# Patient Record
Sex: FEMALE | Race: BLACK OR AFRICAN AMERICAN | NOT HISPANIC OR LATINO
[De-identification: names, ages, dates, MRNs, and addresses within clinical notes are randomized per-mention and may not be internally consistent; named-entity substitution may affect disease eponyms.]

---

## 2017-01-23 ENCOUNTER — APPOINTMENT (OUTPATIENT)
Dept: RHEUMATOLOGY | Facility: HOSPITAL | Age: 62
End: 2017-01-23

## 2017-01-30 ENCOUNTER — APPOINTMENT (OUTPATIENT)
Dept: RHEUMATOLOGY | Facility: HOSPITAL | Age: 62
End: 2017-01-30

## 2017-02-26 ENCOUNTER — FORM ENCOUNTER (OUTPATIENT)
Age: 62
End: 2017-02-26

## 2017-02-27 ENCOUNTER — LABORATORY RESULT (OUTPATIENT)
Age: 62
End: 2017-02-27

## 2017-02-27 ENCOUNTER — APPOINTMENT (OUTPATIENT)
Dept: RADIOLOGY | Facility: HOSPITAL | Age: 62
End: 2017-02-27

## 2017-02-27 ENCOUNTER — APPOINTMENT (OUTPATIENT)
Dept: RHEUMATOLOGY | Facility: HOSPITAL | Age: 62
End: 2017-02-27

## 2017-02-27 ENCOUNTER — OUTPATIENT (OUTPATIENT)
Dept: OUTPATIENT SERVICES | Facility: HOSPITAL | Age: 62
LOS: 1 days | End: 2017-02-27
Payer: MEDICAID

## 2017-02-27 VITALS
DIASTOLIC BLOOD PRESSURE: 100 MMHG | SYSTOLIC BLOOD PRESSURE: 195 MMHG | BODY MASS INDEX: 32.33 KG/M2 | WEIGHT: 206 LBS | HEIGHT: 67 IN

## 2017-02-27 DIAGNOSIS — M06.9 RHEUMATOID ARTHRITIS, UNSPECIFIED: ICD-10-CM

## 2017-02-27 DIAGNOSIS — M19.90 UNSPECIFIED OSTEOARTHRITIS, UNSPECIFIED SITE: ICD-10-CM

## 2017-02-27 LAB
ALBUMIN SERPL ELPH-MCNC: 4.1 G/DL — SIGNIFICANT CHANGE UP (ref 3.3–5)
ALP SERPL-CCNC: 115 U/L — SIGNIFICANT CHANGE UP (ref 40–120)
ALT FLD-CCNC: 14 U/L — SIGNIFICANT CHANGE UP (ref 4–33)
AST SERPL-CCNC: 15 U/L — SIGNIFICANT CHANGE UP (ref 4–32)
BASOPHILS # BLD AUTO: 0.05 K/UL — SIGNIFICANT CHANGE UP (ref 0–0.2)
BASOPHILS NFR BLD AUTO: 0.8 % — SIGNIFICANT CHANGE UP (ref 0–2)
BILIRUB SERPL-MCNC: 0.4 MG/DL — SIGNIFICANT CHANGE UP (ref 0.2–1.2)
BUN SERPL-MCNC: 12 MG/DL — SIGNIFICANT CHANGE UP (ref 7–23)
CALCIUM SERPL-MCNC: 10.5 MG/DL — SIGNIFICANT CHANGE UP (ref 8.4–10.5)
CHLORIDE SERPL-SCNC: 101 MMOL/L — SIGNIFICANT CHANGE UP (ref 98–107)
CO2 SERPL-SCNC: 28 MMOL/L — SIGNIFICANT CHANGE UP (ref 22–31)
CREAT SERPL-MCNC: 0.76 MG/DL — SIGNIFICANT CHANGE UP (ref 0.5–1.3)
CRP SERPL-MCNC: 14.6 MG/L — HIGH (ref 0.3–5)
EOSINOPHIL # BLD AUTO: 0.12 K/UL — SIGNIFICANT CHANGE UP (ref 0–0.5)
EOSINOPHIL NFR BLD AUTO: 1.8 % — SIGNIFICANT CHANGE UP (ref 0–6)
ERYTHROCYTE [SEDIMENTATION RATE] IN BLOOD: 51 MM/HR — HIGH (ref 4–25)
GLUCOSE SERPL-MCNC: 147 MG/DL — HIGH (ref 70–99)
HCT VFR BLD CALC: 39.4 % — SIGNIFICANT CHANGE UP (ref 34.5–45)
HGB BLD-MCNC: 12.6 G/DL — SIGNIFICANT CHANGE UP (ref 11.5–15.5)
IMM GRANULOCYTES NFR BLD AUTO: 0.2 % — SIGNIFICANT CHANGE UP (ref 0–1.5)
LYMPHOCYTES # BLD AUTO: 2.64 K/UL — SIGNIFICANT CHANGE UP (ref 1–3.3)
LYMPHOCYTES # BLD AUTO: 40.1 % — SIGNIFICANT CHANGE UP (ref 13–44)
MANUAL SMEAR VERIFICATION: SIGNIFICANT CHANGE UP
MCHC RBC-ENTMCNC: 28.6 PG — SIGNIFICANT CHANGE UP (ref 27–34)
MCHC RBC-ENTMCNC: 32 % — SIGNIFICANT CHANGE UP (ref 32–36)
MCV RBC AUTO: 89.3 FL — SIGNIFICANT CHANGE UP (ref 80–100)
MONOCYTES # BLD AUTO: 0.52 K/UL — SIGNIFICANT CHANGE UP (ref 0–0.9)
MONOCYTES NFR BLD AUTO: 7.9 % — SIGNIFICANT CHANGE UP (ref 2–14)
MORPHOLOGY BLD-IMP: SIGNIFICANT CHANGE UP
NEUTROPHILS # BLD AUTO: 3.25 K/UL — SIGNIFICANT CHANGE UP (ref 1.8–7.4)
NEUTROPHILS NFR BLD AUTO: 49.2 % — SIGNIFICANT CHANGE UP (ref 43–77)
PLATELET # BLD AUTO: 289 K/UL — SIGNIFICANT CHANGE UP (ref 150–400)
PLATELET COUNT - ESTIMATE: NORMAL — SIGNIFICANT CHANGE UP
PMV BLD: 12.3 FL — SIGNIFICANT CHANGE UP (ref 7–13)
POTASSIUM SERPL-MCNC: 4 MMOL/L — SIGNIFICANT CHANGE UP (ref 3.5–5.3)
POTASSIUM SERPL-SCNC: 4 MMOL/L — SIGNIFICANT CHANGE UP (ref 3.5–5.3)
PROT SERPL-MCNC: 8.3 G/DL — SIGNIFICANT CHANGE UP (ref 6–8.3)
RBC # BLD: 4.41 M/UL — SIGNIFICANT CHANGE UP (ref 3.8–5.2)
RBC # FLD: 13.2 % — SIGNIFICANT CHANGE UP (ref 10.3–14.5)
SODIUM SERPL-SCNC: 144 MMOL/L — SIGNIFICANT CHANGE UP (ref 135–145)
WBC # BLD: 6.59 K/UL — SIGNIFICANT CHANGE UP (ref 3.8–10.5)
WBC # FLD AUTO: 6.59 K/UL — SIGNIFICANT CHANGE UP (ref 3.8–10.5)

## 2017-02-27 PROCEDURE — 73564 X-RAY EXAM KNEE 4 OR MORE: CPT | Mod: 26,50

## 2017-03-20 ENCOUNTER — OUTPATIENT (OUTPATIENT)
Dept: OUTPATIENT SERVICES | Facility: HOSPITAL | Age: 62
LOS: 1 days | End: 2017-03-20

## 2017-03-20 ENCOUNTER — LABORATORY RESULT (OUTPATIENT)
Age: 62
End: 2017-03-20

## 2017-03-20 ENCOUNTER — APPOINTMENT (OUTPATIENT)
Dept: RHEUMATOLOGY | Facility: HOSPITAL | Age: 62
End: 2017-03-20

## 2017-03-20 VITALS
WEIGHT: 207 LBS | DIASTOLIC BLOOD PRESSURE: 77 MMHG | HEIGHT: 67 IN | SYSTOLIC BLOOD PRESSURE: 176 MMHG | BODY MASS INDEX: 32.49 KG/M2 | HEART RATE: 80 BPM

## 2017-03-20 DIAGNOSIS — M25.569 PAIN IN UNSPECIFIED KNEE: ICD-10-CM

## 2017-03-20 DIAGNOSIS — M06.9 RHEUMATOID ARTHRITIS, UNSPECIFIED: ICD-10-CM

## 2017-03-20 LAB
ALBUMIN SERPL ELPH-MCNC: 4.1 G/DL — SIGNIFICANT CHANGE UP (ref 3.3–5)
ALP SERPL-CCNC: 108 U/L — SIGNIFICANT CHANGE UP (ref 40–120)
ALT FLD-CCNC: 11 U/L — SIGNIFICANT CHANGE UP (ref 4–33)
AST SERPL-CCNC: 14 U/L — SIGNIFICANT CHANGE UP (ref 4–32)
BASOPHILS # BLD AUTO: 0.08 K/UL — SIGNIFICANT CHANGE UP (ref 0–0.2)
BASOPHILS NFR BLD AUTO: 1.1 % — SIGNIFICANT CHANGE UP (ref 0–2)
BILIRUB SERPL-MCNC: 0.3 MG/DL — SIGNIFICANT CHANGE UP (ref 0.2–1.2)
BUN SERPL-MCNC: 10 MG/DL — SIGNIFICANT CHANGE UP (ref 7–23)
CALCIUM SERPL-MCNC: 10.1 MG/DL — SIGNIFICANT CHANGE UP (ref 8.4–10.5)
CHLORIDE SERPL-SCNC: 103 MMOL/L — SIGNIFICANT CHANGE UP (ref 98–107)
CO2 SERPL-SCNC: 23 MMOL/L — SIGNIFICANT CHANGE UP (ref 22–31)
CREAT SERPL-MCNC: 0.67 MG/DL — SIGNIFICANT CHANGE UP (ref 0.5–1.3)
CRP SERPL-MCNC: 8 MG/L — HIGH (ref 0.3–5)
EOSINOPHIL # BLD AUTO: 0.14 K/UL — SIGNIFICANT CHANGE UP (ref 0–0.5)
EOSINOPHIL NFR BLD AUTO: 1.9 % — SIGNIFICANT CHANGE UP (ref 0–6)
ERYTHROCYTE [SEDIMENTATION RATE] IN BLOOD: 74 MM/HR — HIGH (ref 4–25)
GLUCOSE SERPL-MCNC: 176 MG/DL — HIGH (ref 70–99)
HCT VFR BLD CALC: 36.8 % — SIGNIFICANT CHANGE UP (ref 34.5–45)
HGB BLD-MCNC: 11.6 G/DL — SIGNIFICANT CHANGE UP (ref 11.5–15.5)
IMM GRANULOCYTES NFR BLD AUTO: 0.1 % — SIGNIFICANT CHANGE UP (ref 0–1.5)
LYMPHOCYTES # BLD AUTO: 3.14 K/UL — SIGNIFICANT CHANGE UP (ref 1–3.3)
LYMPHOCYTES # BLD AUTO: 42.5 % — SIGNIFICANT CHANGE UP (ref 13–44)
MCHC RBC-ENTMCNC: 28.3 PG — SIGNIFICANT CHANGE UP (ref 27–34)
MCHC RBC-ENTMCNC: 31.5 % — LOW (ref 32–36)
MCV RBC AUTO: 89.8 FL — SIGNIFICANT CHANGE UP (ref 80–100)
MONOCYTES # BLD AUTO: 0.62 K/UL — SIGNIFICANT CHANGE UP (ref 0–0.9)
MONOCYTES NFR BLD AUTO: 8.4 % — SIGNIFICANT CHANGE UP (ref 2–14)
NEUTROPHILS # BLD AUTO: 3.39 K/UL — SIGNIFICANT CHANGE UP (ref 1.8–7.4)
NEUTROPHILS NFR BLD AUTO: 46 % — SIGNIFICANT CHANGE UP (ref 43–77)
PLATELET # BLD AUTO: 378 K/UL — SIGNIFICANT CHANGE UP (ref 150–400)
PMV BLD: 11.7 FL — SIGNIFICANT CHANGE UP (ref 7–13)
POTASSIUM SERPL-MCNC: 3.6 MMOL/L — SIGNIFICANT CHANGE UP (ref 3.5–5.3)
POTASSIUM SERPL-SCNC: 3.6 MMOL/L — SIGNIFICANT CHANGE UP (ref 3.5–5.3)
PROT SERPL-MCNC: 8.5 G/DL — HIGH (ref 6–8.3)
RBC # BLD: 4.1 M/UL — SIGNIFICANT CHANGE UP (ref 3.8–5.2)
RBC # FLD: 13.2 % — SIGNIFICANT CHANGE UP (ref 10.3–14.5)
SODIUM SERPL-SCNC: 144 MMOL/L — SIGNIFICANT CHANGE UP (ref 135–145)
WBC # BLD: 7.38 K/UL — SIGNIFICANT CHANGE UP (ref 3.8–10.5)
WBC # FLD AUTO: 7.38 K/UL — SIGNIFICANT CHANGE UP (ref 3.8–10.5)

## 2017-05-22 ENCOUNTER — APPOINTMENT (OUTPATIENT)
Dept: RHEUMATOLOGY | Facility: HOSPITAL | Age: 62
End: 2017-05-22

## 2017-06-09 ENCOUNTER — INPATIENT (INPATIENT)
Facility: HOSPITAL | Age: 62
LOS: 0 days | Discharge: HOME | End: 2017-06-10
Attending: INTERNAL MEDICINE | Admitting: INTERNAL MEDICINE
Payer: MEDICAID

## 2017-06-09 VITALS
SYSTOLIC BLOOD PRESSURE: 185 MMHG | RESPIRATION RATE: 16 BRPM | TEMPERATURE: 98 F | DIASTOLIC BLOOD PRESSURE: 78 MMHG | HEART RATE: 76 BPM | OXYGEN SATURATION: 97 %

## 2017-06-09 DIAGNOSIS — I24.9 ACUTE ISCHEMIC HEART DISEASE, UNSPECIFIED: ICD-10-CM

## 2017-06-09 DIAGNOSIS — Z98.890 OTHER SPECIFIED POSTPROCEDURAL STATES: Chronic | ICD-10-CM

## 2017-06-09 LAB
ALBUMIN SERPL ELPH-MCNC: 4.3 G/DL — SIGNIFICANT CHANGE UP (ref 3.3–5)
ALP SERPL-CCNC: 117 U/L — SIGNIFICANT CHANGE UP (ref 40–120)
ALT FLD-CCNC: 13 U/L — SIGNIFICANT CHANGE UP (ref 4–33)
APPEARANCE UR: CLEAR — SIGNIFICANT CHANGE UP
APTT BLD: 33.6 SEC — SIGNIFICANT CHANGE UP (ref 27.5–37.4)
AST SERPL-CCNC: 15 U/L — SIGNIFICANT CHANGE UP (ref 4–32)
BACTERIA # UR AUTO: SIGNIFICANT CHANGE UP
BASOPHILS # BLD AUTO: 0.04 K/UL — SIGNIFICANT CHANGE UP (ref 0–0.2)
BASOPHILS NFR BLD AUTO: 0.5 % — SIGNIFICANT CHANGE UP (ref 0–2)
BILIRUB SERPL-MCNC: 0.2 MG/DL — SIGNIFICANT CHANGE UP (ref 0.2–1.2)
BILIRUB UR-MCNC: NEGATIVE — SIGNIFICANT CHANGE UP
BLOOD UR QL VISUAL: NEGATIVE — SIGNIFICANT CHANGE UP
BUN SERPL-MCNC: 11 MG/DL — SIGNIFICANT CHANGE UP (ref 7–23)
CALCIUM SERPL-MCNC: 9.7 MG/DL — SIGNIFICANT CHANGE UP (ref 8.4–10.5)
CHLORIDE SERPL-SCNC: 103 MMOL/L — SIGNIFICANT CHANGE UP (ref 98–107)
CK MB BLD-MCNC: 1.68 NG/ML — SIGNIFICANT CHANGE UP (ref 1–4.7)
CK MB BLD-MCNC: SIGNIFICANT CHANGE UP (ref 0–2.5)
CK SERPL-CCNC: 113 U/L — SIGNIFICANT CHANGE UP (ref 25–170)
CO2 SERPL-SCNC: 27 MMOL/L — SIGNIFICANT CHANGE UP (ref 22–31)
COLOR SPEC: SIGNIFICANT CHANGE UP
CREAT SERPL-MCNC: 0.82 MG/DL — SIGNIFICANT CHANGE UP (ref 0.5–1.3)
D DIMER BLD IA.RAPID-MCNC: 169 NG/ML — SIGNIFICANT CHANGE UP
EOSINOPHIL # BLD AUTO: 0.15 K/UL — SIGNIFICANT CHANGE UP (ref 0–0.5)
EOSINOPHIL NFR BLD AUTO: 1.7 % — SIGNIFICANT CHANGE UP (ref 0–6)
GLUCOSE SERPL-MCNC: 110 MG/DL — HIGH (ref 70–99)
GLUCOSE UR-MCNC: NEGATIVE — SIGNIFICANT CHANGE UP
HCT VFR BLD CALC: 40.2 % — SIGNIFICANT CHANGE UP (ref 34.5–45)
HGB BLD-MCNC: 12.6 G/DL — SIGNIFICANT CHANGE UP (ref 11.5–15.5)
IMM GRANULOCYTES NFR BLD AUTO: 0.1 % — SIGNIFICANT CHANGE UP (ref 0–1.5)
INR BLD: 1.03 — SIGNIFICANT CHANGE UP (ref 0.88–1.17)
KETONES UR-MCNC: NEGATIVE — SIGNIFICANT CHANGE UP
LEUKOCYTE ESTERASE UR-ACNC: NEGATIVE — SIGNIFICANT CHANGE UP
LYMPHOCYTES # BLD AUTO: 4.84 K/UL — HIGH (ref 1–3.3)
LYMPHOCYTES # BLD AUTO: 55.1 % — HIGH (ref 13–44)
MCHC RBC-ENTMCNC: 27.9 PG — SIGNIFICANT CHANGE UP (ref 27–34)
MCHC RBC-ENTMCNC: 31.3 % — LOW (ref 32–36)
MCV RBC AUTO: 89.1 FL — SIGNIFICANT CHANGE UP (ref 80–100)
MONOCYTES # BLD AUTO: 0.81 K/UL — SIGNIFICANT CHANGE UP (ref 0–0.9)
MONOCYTES NFR BLD AUTO: 9.2 % — SIGNIFICANT CHANGE UP (ref 2–14)
MUCOUS THREADS # UR AUTO: SIGNIFICANT CHANGE UP
NEUTROPHILS # BLD AUTO: 2.94 K/UL — SIGNIFICANT CHANGE UP (ref 1.8–7.4)
NEUTROPHILS NFR BLD AUTO: 33.4 % — LOW (ref 43–77)
NITRITE UR-MCNC: NEGATIVE — SIGNIFICANT CHANGE UP
PH UR: 7 — SIGNIFICANT CHANGE UP (ref 4.6–8)
PLATELET # BLD AUTO: 325 K/UL — SIGNIFICANT CHANGE UP (ref 150–400)
PMV BLD: 11.2 FL — SIGNIFICANT CHANGE UP (ref 7–13)
POTASSIUM SERPL-MCNC: 3.9 MMOL/L — SIGNIFICANT CHANGE UP (ref 3.5–5.3)
POTASSIUM SERPL-SCNC: 3.9 MMOL/L — SIGNIFICANT CHANGE UP (ref 3.5–5.3)
PROT SERPL-MCNC: 8.6 G/DL — HIGH (ref 6–8.3)
PROT UR-MCNC: NEGATIVE — SIGNIFICANT CHANGE UP
PROTHROM AB SERPL-ACNC: 11.5 SEC — SIGNIFICANT CHANGE UP (ref 9.8–13.1)
RBC # BLD: 4.51 M/UL — SIGNIFICANT CHANGE UP (ref 3.8–5.2)
RBC # FLD: 13.3 % — SIGNIFICANT CHANGE UP (ref 10.3–14.5)
RBC CASTS # UR COMP ASSIST: SIGNIFICANT CHANGE UP (ref 0–?)
SODIUM SERPL-SCNC: 143 MMOL/L — SIGNIFICANT CHANGE UP (ref 135–145)
SP GR SPEC: 1.01 — SIGNIFICANT CHANGE UP (ref 1–1.03)
SQUAMOUS # UR AUTO: SIGNIFICANT CHANGE UP
TROPONIN T SERPL-MCNC: < 0.06 NG/ML — SIGNIFICANT CHANGE UP (ref 0–0.06)
UROBILINOGEN FLD QL: NORMAL E.U. — SIGNIFICANT CHANGE UP (ref 0.1–0.2)
WBC # BLD: 8.79 K/UL — SIGNIFICANT CHANGE UP (ref 3.8–10.5)
WBC # FLD AUTO: 8.79 K/UL — SIGNIFICANT CHANGE UP (ref 3.8–10.5)
WBC UR QL: SIGNIFICANT CHANGE UP (ref 0–?)

## 2017-06-09 PROCEDURE — 71020: CPT | Mod: 26

## 2017-06-09 RX ORDER — ACETAMINOPHEN 500 MG
650 TABLET ORAL EVERY 6 HOURS
Qty: 0 | Refills: 0 | Status: DISCONTINUED | OUTPATIENT
Start: 2017-06-09 | End: 2017-06-10

## 2017-06-09 RX ORDER — ASPIRIN/CALCIUM CARB/MAGNESIUM 324 MG
162 TABLET ORAL ONCE
Qty: 0 | Refills: 0 | Status: COMPLETED | OUTPATIENT
Start: 2017-06-09 | End: 2017-06-09

## 2017-06-09 RX ORDER — LIDOCAINE 4 G/100G
1 CREAM TOPICAL DAILY
Qty: 0 | Refills: 0 | Status: DISCONTINUED | OUTPATIENT
Start: 2017-06-09 | End: 2017-06-10

## 2017-06-09 RX ORDER — CYCLOBENZAPRINE HYDROCHLORIDE 10 MG/1
5 TABLET, FILM COATED ORAL THREE TIMES A DAY
Qty: 0 | Refills: 0 | Status: DISCONTINUED | OUTPATIENT
Start: 2017-06-09 | End: 2017-06-10

## 2017-06-09 RX ORDER — OXYCODONE HYDROCHLORIDE 5 MG/1
5 TABLET ORAL ONCE
Qty: 0 | Refills: 0 | Status: DISCONTINUED | OUTPATIENT
Start: 2017-06-09 | End: 2017-06-09

## 2017-06-09 RX ADMIN — Medication 162 MILLIGRAM(S): at 22:03

## 2017-06-09 NOTE — ED ADULT TRIAGE NOTE - CHIEF COMPLAINT QUOTE
Pt arrives to ED c/o back pain that radiates to her chest.  Pain has been going on for 3 days.  Pt has hx of a heart murmur.  Pt hypertensive in triage.  Pt reports she takes HTN meds.  EKG in triage.  Pt is mother of [employer] employee. Pt arrives to ED c/o back pain that radiates to her chest.  Pain has been going on for 3 days.  Pt has hx of a heart murmur.  Pt hypertensive in triage.  Pt reports she takes HTN meds.  EKG in triage.  Pt is mother of [employer] employee.  Pt upgraded due to ekg results

## 2017-06-09 NOTE — ED PROVIDER NOTE - OBJECTIVE STATEMENT
61F p/w R lower chest pain wrapping around x 3 days, initially intermittent but now worsening.  A/w CORREA and worsening on exertion (stairs), and better at rest.  No trauma, no fever, no dysuria, no vomiting, no change with food, no rash, never happened before.  Pt has RA on Humira, no recent ST or cath.

## 2017-06-09 NOTE — ED ADULT NURSE NOTE - OBJECTIVE STATEMENT
pt received to room 5 pt is A&0x3 pt comes to ED for back pain that travels to her chest x 3 days. pt has hx of HTN. pt BP is elevated otherwise VSS pt is NSR on monitor. 20 g placed in R AC labs were drawn and sent EDMD at bedside for eval awaiting further orders Will continue to monitor the pt

## 2017-06-09 NOTE — ED PROVIDER NOTE - MEDICAL DECISION MAKING DETAILS
61F p/w R sided chest pain, exertional, a/w dizziness and some SOB.  EKG abnormal, subtle ST depressions II, III, AVF, V5, no TWI.  RX ASA.  Given RA (RF for ACS) will check labs, CXR, CE.   Given RA and laterality of pain and dyspnea will check dimer.  Admit

## 2017-06-09 NOTE — ED SUB INTERN NOTE - OBJECTIVE STATEMENT FT
61yF with RA on Humira, HTN, recent EKG change and new murmur found by PMD p/w R chest pain radiating to back for 3d. Pain was sudden in onset and waxed and waned until yday PM, when pain became constant 7/10.

## 2017-06-10 VITALS
SYSTOLIC BLOOD PRESSURE: 139 MMHG | RESPIRATION RATE: 16 BRPM | DIASTOLIC BLOOD PRESSURE: 61 MMHG | TEMPERATURE: 98 F | HEART RATE: 60 BPM | OXYGEN SATURATION: 100 %

## 2017-06-10 DIAGNOSIS — Z29.9 ENCOUNTER FOR PROPHYLACTIC MEASURES, UNSPECIFIED: ICD-10-CM

## 2017-06-10 DIAGNOSIS — I10 ESSENTIAL (PRIMARY) HYPERTENSION: ICD-10-CM

## 2017-06-10 DIAGNOSIS — T14.8 OTHER INJURY OF UNSPECIFIED BODY REGION: ICD-10-CM

## 2017-06-10 DIAGNOSIS — M06.9 RHEUMATOID ARTHRITIS, UNSPECIFIED: ICD-10-CM

## 2017-06-10 DIAGNOSIS — I24.9 ACUTE ISCHEMIC HEART DISEASE, UNSPECIFIED: ICD-10-CM

## 2017-06-10 LAB
CHOLEST SERPL-MCNC: 234 MG/DL — HIGH (ref 120–199)
CK SERPL-CCNC: 103 U/L — SIGNIFICANT CHANGE UP (ref 25–170)
HBA1C BLD-MCNC: 7.4 % — HIGH (ref 4–5.6)
HDLC SERPL-MCNC: 107 MG/DL — HIGH (ref 45–65)
LIPID PNL WITH DIRECT LDL SERPL: 121 MG/DL — SIGNIFICANT CHANGE UP
TRIGL SERPL-MCNC: 131 MG/DL — SIGNIFICANT CHANGE UP (ref 10–149)
TROPONIN T SERPL-MCNC: < 0.06 NG/ML — SIGNIFICANT CHANGE UP (ref 0–0.06)

## 2017-06-10 PROCEDURE — 71250 CT THORAX DX C-: CPT | Mod: 26

## 2017-06-10 RX ORDER — ATENOLOL 25 MG/1
1 TABLET ORAL
Qty: 0 | Refills: 0 | COMMUNITY

## 2017-06-10 RX ORDER — SODIUM CHLORIDE 9 MG/ML
3 INJECTION INTRAMUSCULAR; INTRAVENOUS; SUBCUTANEOUS EVERY 8 HOURS
Qty: 0 | Refills: 0 | Status: DISCONTINUED | OUTPATIENT
Start: 2017-06-10 | End: 2017-06-10

## 2017-06-10 RX ORDER — ASPIRIN/CALCIUM CARB/MAGNESIUM 324 MG
81 TABLET ORAL DAILY
Qty: 0 | Refills: 0 | Status: DISCONTINUED | OUTPATIENT
Start: 2017-06-10 | End: 2017-06-10

## 2017-06-10 RX ORDER — AMLODIPINE BESYLATE 2.5 MG/1
5 TABLET ORAL DAILY
Qty: 0 | Refills: 0 | Status: DISCONTINUED | OUTPATIENT
Start: 2017-06-10 | End: 2017-06-10

## 2017-06-10 RX ORDER — OXYCODONE HYDROCHLORIDE 5 MG/1
1 TABLET ORAL
Qty: 12 | Refills: 0 | OUTPATIENT
Start: 2017-06-10 | End: 2017-06-13

## 2017-06-10 RX ORDER — AMLODIPINE BESYLATE 2.5 MG/1
1 TABLET ORAL
Qty: 0 | Refills: 0 | COMMUNITY

## 2017-06-10 RX ORDER — ASPIRIN/CALCIUM CARB/MAGNESIUM 324 MG
1 TABLET ORAL
Qty: 0 | Refills: 0 | COMMUNITY

## 2017-06-10 RX ORDER — ENOXAPARIN SODIUM 100 MG/ML
40 INJECTION SUBCUTANEOUS EVERY 24 HOURS
Qty: 0 | Refills: 0 | Status: DISCONTINUED | OUTPATIENT
Start: 2017-06-10 | End: 2017-06-10

## 2017-06-10 RX ORDER — CYCLOBENZAPRINE HYDROCHLORIDE 10 MG/1
1 TABLET, FILM COATED ORAL
Qty: 9 | Refills: 0 | OUTPATIENT
Start: 2017-06-10 | End: 2017-06-13

## 2017-06-10 RX ORDER — ATENOLOL 25 MG/1
50 TABLET ORAL DAILY
Qty: 0 | Refills: 0 | Status: DISCONTINUED | OUTPATIENT
Start: 2017-06-10 | End: 2017-06-10

## 2017-06-10 RX ADMIN — ATENOLOL 50 MILLIGRAM(S): 25 TABLET ORAL at 06:49

## 2017-06-10 RX ADMIN — Medication 81 MILLIGRAM(S): at 11:56

## 2017-06-10 RX ADMIN — LIDOCAINE 1 PATCH: 4 CREAM TOPICAL at 11:56

## 2017-06-10 RX ADMIN — AMLODIPINE BESYLATE 5 MILLIGRAM(S): 2.5 TABLET ORAL at 06:46

## 2017-06-10 RX ADMIN — CYCLOBENZAPRINE HYDROCHLORIDE 5 MILLIGRAM(S): 10 TABLET, FILM COATED ORAL at 03:46

## 2017-06-10 RX ADMIN — OXYCODONE HYDROCHLORIDE 5 MILLIGRAM(S): 5 TABLET ORAL at 01:51

## 2017-06-10 RX ADMIN — SODIUM CHLORIDE 3 MILLILITER(S): 9 INJECTION INTRAMUSCULAR; INTRAVENOUS; SUBCUTANEOUS at 06:50

## 2017-06-10 RX ADMIN — OXYCODONE HYDROCHLORIDE 5 MILLIGRAM(S): 5 TABLET ORAL at 00:44

## 2017-06-10 RX ADMIN — CYCLOBENZAPRINE HYDROCHLORIDE 5 MILLIGRAM(S): 10 TABLET, FILM COATED ORAL at 11:56

## 2017-06-10 RX ADMIN — LIDOCAINE 1 PATCH: 4 CREAM TOPICAL at 00:44

## 2017-06-10 RX ADMIN — ENOXAPARIN SODIUM 40 MILLIGRAM(S): 100 INJECTION SUBCUTANEOUS at 06:49

## 2017-06-10 RX ADMIN — Medication 10 MILLIGRAM(S): at 06:50

## 2017-06-10 NOTE — H&P ADULT - HISTORY OF PRESENT ILLNESS
61F with a history of HTN and RA experiencing progressive, exertional, R sided, lower chest that travels around to the back, unable to describe the sensation. But has a max intensity of 7/10.  The episodes last for 5 to 6 minutes, subside and return again several times throughout the day.  The episodes have been occurring more frequently, so th ept decide to go to the Ed.  The pt denies SOB, nausea, vomit, diaphoresis, chills, dysuria.

## 2017-06-10 NOTE — DISCHARGE NOTE ADULT - CARE PLAN
Principal Discharge DX:	Atypical chest pain  Goal:	prevent further episodes  Instructions for follow-up, activity and diet:	follow up with your PMD in one week - call for appointment Principal Discharge DX:	Atypical chest pain  Goal:	prevent further episodes  Instructions for follow-up, activity and diet:	follow up with your PMD in one week - call for appointment  Will need an outpatient Echocardiogram.  Call Dr. Young (Cardiologist) for an appointment  Secondary Diagnosis:	Essential hypertension  Goal:	monitor blood pressure. Continue medications as prescribed.  Instructions for follow-up, activity and diet:	Low sodium diet

## 2017-06-10 NOTE — DISCHARGE NOTE ADULT - MEDICATION SUMMARY - MEDICATIONS TO TAKE
I will START or STAY ON the medications listed below when I get home from the hospital:    Aspirin Enteric Coated 81 mg oral delayed release tablet  -- 1 tab(s) by mouth once a day  -- Indication: For Prophylactic measure    acetaminophen-oxycodone 325 mg-5 mg oral tablet  -- 1 tab(s) by mouth every 6 hours MDD:4  -- Caution federal law prohibits the transfer of this drug to any person other  than the person for whom it was prescribed.  May cause drowsiness.  Alcohol may intensify this effect.  Use care when operating dangerous machinery.  This prescription cannot be refilled.  This product contains acetaminophen.  Do not use  with any other product containing acetaminophen to prevent possible liver damage.  Using more of this medication than prescribed may cause serious breathing problems.    -- Indication: For Pain Control    enalapril-hydrochlorothiazide 10 mg-25 mg oral tablet  -- 1 tab(s) by mouth once a day  -- Indication: For HTN (hypertension)    atenolol 50 mg oral tablet  -- 1 tab(s) by mouth once a day  -- Indication: For HTN (hypertension)    Norvasc 5 mg oral tablet  -- 1 tab(s) by mouth once a day  -- Indication: For HTN (hypertension)    cyclobenzaprine 10 mg oral tablet  -- 1 tab(s) by mouth 3 times a day, As Needed  -- May cause drowsiness.  Alcohol may intensify this effect.  Use care when operating dangerous machinery.  Obtain medical advice before taking any non-prescription drugs as some may affect the action of this medication.    -- Indication: For Muscle stain

## 2017-06-10 NOTE — DISCHARGE NOTE ADULT - CARE PROVIDER_API CALL
Alexis Young (MD), Cardiovascular Disease; Internal Medicine; Interventional Cardiology; Nuclear Cardiology  [address] [facility] [address]  [place] [address]  Phone: [phone]  Fax: [phone]

## 2017-06-10 NOTE — DISCHARGE NOTE ADULT - PLAN OF CARE
prevent further episodes follow up with your PMD in one week - call for appointment monitor blood pressure. Continue medications as prescribed. Low sodium diet follow up with your PMD in one week - call for appointment  Will need an outpatient Echocardiogram.  Call Dr. Young (Cardiologist) for an appointment

## 2017-06-10 NOTE — DISCHARGE NOTE ADULT - HOSPITAL COURSE
62F admitted for R sided chest pain     CE negatve x 2  CXR No urgent/emergent findings  EKG NSR @ 83b/ min, LAE, Q wave v1 v2  CE negative x 2  UA clear  CT Chest: No pneumonia.    atypical cp  no concern for acs  murmur  abnl ekg  pain tx  likely musculoskeletal pain  ct chest negative  echo for murmur can be done as outpt  no indication for inpt stress    Discussed with MD - pt stable for discharge home, pt with no complaints. 60 y/o Female, with a PmHx of HTN and RA, experiencing progressive, exertional, R sided, lower chest that travels around to the back, unable to describe the sensation. But has a max intensity of 7/10.  The episodes last for 5 to 6 minutes, subside and return again several times throughout the day.  The episodes have been occurring more frequently, so th ept decide to go to the Ed.  The pt denies SOB, nausea, vomit, diaphoresis, chills, dysuria. Pt was admitted to telemetry to r/o acs.    On Admission, EKG done showed NSR @ 83b/ min, LAE, Q wave v1 v2. CE x 2 neg. MI ruled out. UA neg. CXR done showed clear lungs. CT chest w/o contrast done shows a small compressive atelectasis in the right lower lobe medially with underlying large right anterolateral bridging osteophytes. Mild subsegmental atelectasis and dependent change at the lung bases. Pulmonary nodules suggestive of intrapulmonary lymph nodes as follows - a 5mm subpleural thickening in the right upper lobe and a 2 mm and 5mm right upper lobe nodules. No pneumonia. Pt was found to have a HgbA1-c of 7.1 and will need to follow up with her primary care doctor. Pt comfortable at this time and is medically cleared for discharge home as per Dr. Young. Outpatient follow up. She will need an outpatient echocardiogram.

## 2017-06-10 NOTE — DISCHARGE NOTE ADULT - PATIENT PORTAL LINK FT
“You can access the [facility] Patient Portal, offered by [facility], by registering with the following website: [email or web address]”

## 2017-06-10 NOTE — H&P ADULT - ATTENDING COMMENTS
pt seen and examined  agree with above    patient admitted with right sided back and chest pain  improved with pain tx    no sternal or left sided cp  no sob, fever, chilsl, recent trauma    ecg  sr, diffuse st abnl  cxr clear    vitals stable  nad aao  x3  nc/at neck supple no jvd  cv s1s2 rrr + sm lungs clear b/l  abd soft, nt  ext no edema    A/P    atypical cp  no concern for acs  murmur  abnl ekg  pain tx  likely musculoskeletal pain  ct chest to r/o any bony etiology for pain  echo for murmur can be done as outpt  if ct chest normal poss d/c home today with pain tx pt seen and examined  agree with above    patient admitted with right sided back and chest pain  improved with pain tx    no sternal or left sided cp  no sob, fever, chilsl, recent trauma    ecg  sr, diffuse st abnl, poss old septal mi  cxr clear    vitals stable  nad aao  x3  nc/at neck supple no jvd  cv s1s2 rrr + sm lungs clear b/l  abd soft, nt  ext no edema    A/P    atypical cp  no concern for acs  murmur  abnl ekg  pain tx  likely musculoskeletal pain  ct chest to r/o any bony etiology for pain  echo for murmur can be done as outpt  if ct chest normal poss d/c home today with pain tx  no indication for inpt stress

## 2017-06-10 NOTE — DISCHARGE NOTE ADULT - ADDITIONAL INSTRUCTIONS
follow up with your PMD in one week - call for appointment  You need an Echocardiogram as an out patient - call PMD to arrange follow up with your PMD in one week - call for appointment  You need an Echocardiogram as an out patient - call PMD to arrange  Follow up with Dr. Young (Cardiologist)

## 2017-06-10 NOTE — H&P ADULT - NEGATIVE ENMT SYMPTOMS
no nasal discharge/no sinus symptoms/no nasal obstruction/no tinnitus/no nasal congestion/no vertigo

## 2017-06-10 NOTE — H&P ADULT - PROBLEM SELECTOR PLAN 2
Tylenol PRN mild pain   Oxy IR PRN Moderate to sever pain   Flexeril po BID PRN  Lidoderm patch to R side chest wall.

## 2017-06-10 NOTE — H&P ADULT - NSHPLABSRESULTS_GEN_ALL_CORE
CXR No urgent/emergent findings  EKG NSR @ 83b/ min, LAE, Q wave v1 v2    CARDIAC MARKERS ( 10 Amish 2017 02:30 )  x     / < 0.06 ng/mL / 103 u/L / x     / x      CARDIAC MARKERS ( 09 Jun 2017 20:30 )  x     / < 0.06 ng/mL / 113 u/L / 1.68 ng/mL / x                              12.6   8.79  )-----------( 325      ( 09 Jun 2017 20:30 )             40.2  06-09    143  |  103  |  11  ----------------------------<  110<H>  3.9   |  27  |  0.82    Ca    9.7      09 Jun 2017 20:30    TPro  8.6<H>  /  Alb  4.3  /  TBili  0.2  /  DBili  x   /  AST  15  /  ALT  13  /  AlkPhos  117  06-09

## 2017-06-19 ENCOUNTER — LABORATORY RESULT (OUTPATIENT)
Age: 62
End: 2017-06-19

## 2017-06-19 ENCOUNTER — APPOINTMENT (OUTPATIENT)
Dept: RHEUMATOLOGY | Facility: HOSPITAL | Age: 62
End: 2017-06-19

## 2017-06-19 ENCOUNTER — OUTPATIENT (OUTPATIENT)
Dept: OUTPATIENT SERVICES | Facility: HOSPITAL | Age: 62
LOS: 1 days | End: 2017-06-19

## 2017-06-19 VITALS
BODY MASS INDEX: 31.86 KG/M2 | HEIGHT: 67 IN | DIASTOLIC BLOOD PRESSURE: 89 MMHG | HEART RATE: 109 BPM | WEIGHT: 203 LBS | SYSTOLIC BLOOD PRESSURE: 173 MMHG

## 2017-06-19 DIAGNOSIS — R10.9 UNSPECIFIED ABDOMINAL PAIN: ICD-10-CM

## 2017-06-19 DIAGNOSIS — M06.9 RHEUMATOID ARTHRITIS, UNSPECIFIED: ICD-10-CM

## 2017-06-19 DIAGNOSIS — Z98.890 OTHER SPECIFIED POSTPROCEDURAL STATES: Chronic | ICD-10-CM

## 2017-06-19 DIAGNOSIS — R91.1 SOLITARY PULMONARY NODULE: ICD-10-CM

## 2017-06-19 DIAGNOSIS — M19.90 UNSPECIFIED OSTEOARTHRITIS, UNSPECIFIED SITE: ICD-10-CM

## 2017-06-19 DIAGNOSIS — R10.83 COLIC: ICD-10-CM

## 2017-06-19 PROBLEM — I10 ESSENTIAL (PRIMARY) HYPERTENSION: Chronic | Status: ACTIVE | Noted: 2017-06-09

## 2017-06-19 LAB
ALBUMIN SERPL ELPH-MCNC: 4.3 G/DL — SIGNIFICANT CHANGE UP (ref 3.3–5)
ALP SERPL-CCNC: 109 U/L — SIGNIFICANT CHANGE UP (ref 40–120)
ALT FLD-CCNC: 12 U/L — SIGNIFICANT CHANGE UP (ref 4–33)
AST SERPL-CCNC: 15 U/L — SIGNIFICANT CHANGE UP (ref 4–32)
BASOPHILS # BLD AUTO: 0.1 K/UL — SIGNIFICANT CHANGE UP (ref 0–0.2)
BASOPHILS NFR BLD AUTO: 1.6 % — SIGNIFICANT CHANGE UP (ref 0–2)
BILIRUB SERPL-MCNC: 0.3 MG/DL — SIGNIFICANT CHANGE UP (ref 0.2–1.2)
BUN SERPL-MCNC: 12 MG/DL — SIGNIFICANT CHANGE UP (ref 7–23)
CALCIUM SERPL-MCNC: 10.2 MG/DL — SIGNIFICANT CHANGE UP (ref 8.4–10.5)
CHLORIDE SERPL-SCNC: 99 MMOL/L — SIGNIFICANT CHANGE UP (ref 98–107)
CO2 SERPL-SCNC: 26 MMOL/L — SIGNIFICANT CHANGE UP (ref 22–31)
CREAT SERPL-MCNC: 0.7 MG/DL — SIGNIFICANT CHANGE UP (ref 0.5–1.3)
CRP SERPL-MCNC: 1.2 MG/L — SIGNIFICANT CHANGE UP (ref 0.3–5)
EOSINOPHIL # BLD AUTO: 0.12 K/UL — SIGNIFICANT CHANGE UP (ref 0–0.5)
EOSINOPHIL NFR BLD AUTO: 1.9 % — SIGNIFICANT CHANGE UP (ref 0–6)
ERYTHROCYTE [SEDIMENTATION RATE] IN BLOOD: 45 MM/HR — HIGH (ref 4–25)
GLUCOSE SERPL-MCNC: 131 MG/DL — HIGH (ref 70–99)
HCT VFR BLD CALC: 40.7 % — SIGNIFICANT CHANGE UP (ref 34.5–45)
HGB BLD-MCNC: 12.4 G/DL — SIGNIFICANT CHANGE UP (ref 11.5–15.5)
IMM GRANULOCYTES NFR BLD AUTO: 0.2 % — SIGNIFICANT CHANGE UP (ref 0–1.5)
LYMPHOCYTES # BLD AUTO: 3.89 K/UL — HIGH (ref 1–3.3)
LYMPHOCYTES # BLD AUTO: 61.6 % — HIGH (ref 13–44)
MCHC RBC-ENTMCNC: 27.4 PG — SIGNIFICANT CHANGE UP (ref 27–34)
MCHC RBC-ENTMCNC: 30.5 % — LOW (ref 32–36)
MCV RBC AUTO: 89.8 FL — SIGNIFICANT CHANGE UP (ref 80–100)
MONOCYTES # BLD AUTO: 0.53 K/UL — SIGNIFICANT CHANGE UP (ref 0–0.9)
MONOCYTES NFR BLD AUTO: 8.4 % — SIGNIFICANT CHANGE UP (ref 2–14)
NEUTROPHILS # BLD AUTO: 1.67 K/UL — LOW (ref 1.8–7.4)
NEUTROPHILS NFR BLD AUTO: 26.3 % — LOW (ref 43–77)
PLATELET # BLD AUTO: 312 K/UL — SIGNIFICANT CHANGE UP (ref 150–400)
PMV BLD: 11.2 FL — SIGNIFICANT CHANGE UP (ref 7–13)
POTASSIUM SERPL-MCNC: 4 MMOL/L — SIGNIFICANT CHANGE UP (ref 3.5–5.3)
POTASSIUM SERPL-SCNC: 4 MMOL/L — SIGNIFICANT CHANGE UP (ref 3.5–5.3)
PROT SERPL-MCNC: 8.7 G/DL — HIGH (ref 6–8.3)
RBC # BLD: 4.53 M/UL — SIGNIFICANT CHANGE UP (ref 3.8–5.2)
RBC # FLD: 13.4 % — SIGNIFICANT CHANGE UP (ref 10.3–14.5)
SODIUM SERPL-SCNC: 141 MMOL/L — SIGNIFICANT CHANGE UP (ref 135–145)
WBC # BLD: 6.32 K/UL — SIGNIFICANT CHANGE UP (ref 3.8–10.5)
WBC # FLD AUTO: 6.32 K/UL — SIGNIFICANT CHANGE UP (ref 3.8–10.5)

## 2017-06-20 LAB
HBV DNA # SERPL NAA+PROBE: NOT DETECTED IU/ML — SIGNIFICANT CHANGE UP
HBV DNA SERPL NAA+PROBE-LOG#: SIGNIFICANT CHANGE UP LOGIU/ML
HCV AB S/CO SERPL IA: 0.15 S/CO — SIGNIFICANT CHANGE UP
HCV AB SERPL-IMP: SIGNIFICANT CHANGE UP

## 2017-06-21 LAB
M TB TUBERC IFN-G BLD QL: 0.07 IU/ML — SIGNIFICANT CHANGE UP
M TB TUBERC IFN-G BLD QL: 0.08 IU/ML — SIGNIFICANT CHANGE UP
M TB TUBERC IFN-G BLD QL: NEGATIVE — SIGNIFICANT CHANGE UP
MITOGEN IGNF BCKGRD COR BLD-ACNC: >10 IU/ML — SIGNIFICANT CHANGE UP

## 2017-07-04 ENCOUNTER — FORM ENCOUNTER (OUTPATIENT)
Age: 62
End: 2017-07-04

## 2017-07-05 ENCOUNTER — APPOINTMENT (OUTPATIENT)
Dept: RADIOLOGY | Facility: IMAGING CENTER | Age: 62
End: 2017-07-05

## 2017-07-05 ENCOUNTER — OUTPATIENT (OUTPATIENT)
Dept: OUTPATIENT SERVICES | Facility: HOSPITAL | Age: 62
LOS: 1 days | End: 2017-07-05
Payer: MEDICAID

## 2017-07-05 DIAGNOSIS — M19.90 UNSPECIFIED OSTEOARTHRITIS, UNSPECIFIED SITE: ICD-10-CM

## 2017-07-05 DIAGNOSIS — Z98.890 OTHER SPECIFIED POSTPROCEDURAL STATES: Chronic | ICD-10-CM

## 2017-07-05 PROCEDURE — 77080 DXA BONE DENSITY AXIAL: CPT

## 2017-07-06 ENCOUNTER — FORM ENCOUNTER (OUTPATIENT)
Age: 62
End: 2017-07-06

## 2017-07-07 ENCOUNTER — APPOINTMENT (OUTPATIENT)
Dept: ULTRASOUND IMAGING | Facility: IMAGING CENTER | Age: 62
End: 2017-07-07

## 2017-07-07 ENCOUNTER — OUTPATIENT (OUTPATIENT)
Dept: OUTPATIENT SERVICES | Facility: HOSPITAL | Age: 62
LOS: 1 days | End: 2017-07-07
Payer: MEDICAID

## 2017-07-07 DIAGNOSIS — Z98.890 OTHER SPECIFIED POSTPROCEDURAL STATES: Chronic | ICD-10-CM

## 2017-07-07 DIAGNOSIS — Z00.8 ENCOUNTER FOR OTHER GENERAL EXAMINATION: ICD-10-CM

## 2017-07-07 PROCEDURE — 76700 US EXAM ABDOM COMPLETE: CPT

## 2017-07-11 ENCOUNTER — APPOINTMENT (OUTPATIENT)
Dept: PULMONOLOGY | Facility: CLINIC | Age: 62
End: 2017-07-11

## 2017-07-11 VITALS
HEART RATE: 72 BPM | OXYGEN SATURATION: 95 % | DIASTOLIC BLOOD PRESSURE: 90 MMHG | WEIGHT: 200 LBS | SYSTOLIC BLOOD PRESSURE: 140 MMHG | BODY MASS INDEX: 31.39 KG/M2 | RESPIRATION RATE: 16 BRPM | TEMPERATURE: 96.8 F | HEIGHT: 67 IN

## 2017-07-11 RX ORDER — ATORVASTATIN CALCIUM 20 MG/1
20 TABLET, FILM COATED ORAL
Qty: 30 | Refills: 0 | Status: DISCONTINUED | COMMUNITY
Start: 2017-03-18

## 2017-07-11 RX ORDER — KETOTIFEN FUMARATE 0.25 MG/ML
0.03 SOLUTION/ DROPS OPHTHALMIC
Qty: 10 | Refills: 0 | Status: ACTIVE | COMMUNITY
Start: 2017-03-18

## 2017-07-11 RX ORDER — ASPIRIN ENTERIC COATED TABLETS 81 MG 81 MG/1
81 TABLET, DELAYED RELEASE ORAL
Qty: 30 | Refills: 0 | Status: ACTIVE | COMMUNITY
Start: 2017-03-18

## 2017-07-15 ENCOUNTER — APPOINTMENT (OUTPATIENT)
Dept: MRI IMAGING | Facility: IMAGING CENTER | Age: 62
End: 2017-07-15

## 2017-08-21 ENCOUNTER — APPOINTMENT (OUTPATIENT)
Dept: RHEUMATOLOGY | Facility: HOSPITAL | Age: 62
End: 2017-08-21
Payer: MEDICAID

## 2017-08-21 ENCOUNTER — OUTPATIENT (OUTPATIENT)
Dept: OUTPATIENT SERVICES | Facility: HOSPITAL | Age: 62
LOS: 1 days | End: 2017-08-21

## 2017-08-21 VITALS
HEART RATE: 70 BPM | WEIGHT: 205 LBS | BODY MASS INDEX: 32.11 KG/M2 | DIASTOLIC BLOOD PRESSURE: 75 MMHG | SYSTOLIC BLOOD PRESSURE: 172 MMHG

## 2017-08-21 DIAGNOSIS — Z98.890 OTHER SPECIFIED POSTPROCEDURAL STATES: Chronic | ICD-10-CM

## 2017-08-21 PROCEDURE — 99215 OFFICE O/P EST HI 40 MIN: CPT | Mod: GC

## 2017-08-23 DIAGNOSIS — M06.9 RHEUMATOID ARTHRITIS, UNSPECIFIED: ICD-10-CM

## 2017-08-24 DIAGNOSIS — R91.1 SOLITARY PULMONARY NODULE: ICD-10-CM

## 2017-08-24 DIAGNOSIS — M19.90 UNSPECIFIED OSTEOARTHRITIS, UNSPECIFIED SITE: ICD-10-CM

## 2017-09-12 PROBLEM — R10.9 ABDOMINAL PAIN OF MULTIPLE SITES: Status: ACTIVE | Noted: 2017-06-19

## 2017-10-23 ENCOUNTER — MEDICATION RENEWAL (OUTPATIENT)
Age: 62
End: 2017-10-23

## 2017-11-21 ENCOUNTER — FORM ENCOUNTER (OUTPATIENT)
Age: 62
End: 2017-11-21

## 2017-11-22 ENCOUNTER — APPOINTMENT (OUTPATIENT)
Dept: RADIOLOGY | Facility: IMAGING CENTER | Age: 62
End: 2017-11-22
Payer: MEDICAID

## 2017-11-22 ENCOUNTER — OUTPATIENT (OUTPATIENT)
Dept: OUTPATIENT SERVICES | Facility: HOSPITAL | Age: 62
LOS: 1 days | End: 2017-11-22
Payer: MEDICAID

## 2017-11-22 DIAGNOSIS — Z98.890 OTHER SPECIFIED POSTPROCEDURAL STATES: Chronic | ICD-10-CM

## 2017-11-22 DIAGNOSIS — Z00.8 ENCOUNTER FOR OTHER GENERAL EXAMINATION: ICD-10-CM

## 2017-11-22 PROCEDURE — 72110 X-RAY EXAM L-2 SPINE 4/>VWS: CPT | Mod: 26

## 2017-11-22 PROCEDURE — 72110 X-RAY EXAM L-2 SPINE 4/>VWS: CPT

## 2018-07-18 ENCOUNTER — RX RENEWAL (OUTPATIENT)
Age: 63
End: 2018-07-18

## 2018-08-13 ENCOUNTER — LABORATORY RESULT (OUTPATIENT)
Age: 63
End: 2018-08-13

## 2018-08-13 ENCOUNTER — APPOINTMENT (OUTPATIENT)
Dept: RHEUMATOLOGY | Facility: HOSPITAL | Age: 63
End: 2018-08-13
Payer: MEDICAID

## 2018-08-13 ENCOUNTER — OUTPATIENT (OUTPATIENT)
Dept: OUTPATIENT SERVICES | Facility: HOSPITAL | Age: 63
LOS: 1 days | End: 2018-08-13

## 2018-08-13 VITALS
BODY MASS INDEX: 31.23 KG/M2 | DIASTOLIC BLOOD PRESSURE: 83 MMHG | HEIGHT: 67 IN | WEIGHT: 199 LBS | HEART RATE: 83 BPM | SYSTOLIC BLOOD PRESSURE: 183 MMHG

## 2018-08-13 DIAGNOSIS — Z98.890 OTHER SPECIFIED POSTPROCEDURAL STATES: Chronic | ICD-10-CM

## 2018-08-13 LAB
ALBUMIN SERPL ELPH-MCNC: 4.4 G/DL — SIGNIFICANT CHANGE UP (ref 3.3–5)
ALP SERPL-CCNC: 112 U/L — SIGNIFICANT CHANGE UP (ref 40–120)
ALT FLD-CCNC: 15 U/L — SIGNIFICANT CHANGE UP (ref 4–33)
AST SERPL-CCNC: 17 U/L — SIGNIFICANT CHANGE UP (ref 4–32)
BASOPHILS # BLD AUTO: 0.08 K/UL — SIGNIFICANT CHANGE UP (ref 0–0.2)
BASOPHILS NFR BLD AUTO: 1 % — SIGNIFICANT CHANGE UP (ref 0–2)
BILIRUB SERPL-MCNC: 0.4 MG/DL — SIGNIFICANT CHANGE UP (ref 0.2–1.2)
BUN SERPL-MCNC: 12 MG/DL — SIGNIFICANT CHANGE UP (ref 7–23)
CALCIUM SERPL-MCNC: 9.9 MG/DL — SIGNIFICANT CHANGE UP (ref 8.4–10.5)
CHLORIDE SERPL-SCNC: 102 MMOL/L — SIGNIFICANT CHANGE UP (ref 98–107)
CO2 SERPL-SCNC: 27 MMOL/L — SIGNIFICANT CHANGE UP (ref 22–31)
CREAT SERPL-MCNC: 0.62 MG/DL — SIGNIFICANT CHANGE UP (ref 0.5–1.3)
CRP SERPL-MCNC: < 4 MG/L — SIGNIFICANT CHANGE UP
EOSINOPHIL # BLD AUTO: 0.16 K/UL — SIGNIFICANT CHANGE UP (ref 0–0.5)
EOSINOPHIL NFR BLD AUTO: 2 % — SIGNIFICANT CHANGE UP (ref 0–6)
ERYTHROCYTE [SEDIMENTATION RATE] IN BLOOD: 37 MM/HR — HIGH (ref 4–25)
GLUCOSE SERPL-MCNC: 160 MG/DL — HIGH (ref 70–99)
HBV CORE AB SER-ACNC: REACTIVE — SIGNIFICANT CHANGE UP
HBV CORE IGM SER-ACNC: NONREACTIVE — SIGNIFICANT CHANGE UP
HBV SURFACE AB SER-ACNC: REACTIVE — SIGNIFICANT CHANGE UP
HBV SURFACE AG SER-ACNC: NONREACTIVE — SIGNIFICANT CHANGE UP
HCT VFR BLD CALC: 40.5 % — SIGNIFICANT CHANGE UP (ref 34.5–45)
HCV AB S/CO SERPL IA: 0.21 S/CO — SIGNIFICANT CHANGE UP
HCV AB SERPL-IMP: SIGNIFICANT CHANGE UP
HGB BLD-MCNC: 12.9 G/DL — SIGNIFICANT CHANGE UP (ref 11.5–15.5)
IMM GRANULOCYTES # BLD AUTO: 0.02 # — SIGNIFICANT CHANGE UP
IMM GRANULOCYTES NFR BLD AUTO: 0.3 % — SIGNIFICANT CHANGE UP (ref 0–1.5)
LYMPHOCYTES # BLD AUTO: 3.97 K/UL — HIGH (ref 1–3.3)
LYMPHOCYTES # BLD AUTO: 50.3 % — HIGH (ref 13–44)
MCHC RBC-ENTMCNC: 28.5 PG — SIGNIFICANT CHANGE UP (ref 27–34)
MCHC RBC-ENTMCNC: 31.9 % — LOW (ref 32–36)
MCV RBC AUTO: 89.6 FL — SIGNIFICANT CHANGE UP (ref 80–100)
MONOCYTES # BLD AUTO: 0.48 K/UL — SIGNIFICANT CHANGE UP (ref 0–0.9)
MONOCYTES NFR BLD AUTO: 6.1 % — SIGNIFICANT CHANGE UP (ref 2–14)
NEUTROPHILS # BLD AUTO: 3.19 K/UL — SIGNIFICANT CHANGE UP (ref 1.8–7.4)
NEUTROPHILS NFR BLD AUTO: 40.3 % — LOW (ref 43–77)
NRBC # FLD: 0 — SIGNIFICANT CHANGE UP
PLATELET # BLD AUTO: 296 K/UL — SIGNIFICANT CHANGE UP (ref 150–400)
PMV BLD: 11.5 FL — SIGNIFICANT CHANGE UP (ref 7–13)
POTASSIUM SERPL-MCNC: 3.9 MMOL/L — SIGNIFICANT CHANGE UP (ref 3.5–5.3)
POTASSIUM SERPL-SCNC: 3.9 MMOL/L — SIGNIFICANT CHANGE UP (ref 3.5–5.3)
PROT SERPL-MCNC: 9.1 G/DL — HIGH (ref 6–8.3)
RBC # BLD: 4.52 M/UL — SIGNIFICANT CHANGE UP (ref 3.8–5.2)
RBC # FLD: 13.3 % — SIGNIFICANT CHANGE UP (ref 10.3–14.5)
SODIUM SERPL-SCNC: 141 MMOL/L — SIGNIFICANT CHANGE UP (ref 135–145)
WBC # BLD: 7.9 K/UL — SIGNIFICANT CHANGE UP (ref 3.8–10.5)
WBC # FLD AUTO: 7.9 K/UL — SIGNIFICANT CHANGE UP (ref 3.8–10.5)

## 2018-08-13 PROCEDURE — 99215 OFFICE O/P EST HI 40 MIN: CPT | Mod: GC

## 2018-08-14 LAB
HBV DNA # SERPL NAA+PROBE: NOT DETECTED — SIGNIFICANT CHANGE UP
HBV DNA SERPL NAA+PROBE-LOG#: SIGNIFICANT CHANGE UP LOGIU/ML

## 2018-08-15 DIAGNOSIS — R01.1 CARDIAC MURMUR, UNSPECIFIED: ICD-10-CM

## 2018-08-15 DIAGNOSIS — I10 ESSENTIAL (PRIMARY) HYPERTENSION: ICD-10-CM

## 2018-08-15 DIAGNOSIS — M19.90 UNSPECIFIED OSTEOARTHRITIS, UNSPECIFIED SITE: ICD-10-CM

## 2018-08-15 DIAGNOSIS — M06.9 RHEUMATOID ARTHRITIS, UNSPECIFIED: ICD-10-CM

## 2018-08-15 DIAGNOSIS — R91.1 SOLITARY PULMONARY NODULE: ICD-10-CM

## 2018-08-31 ENCOUNTER — FORM ENCOUNTER (OUTPATIENT)
Age: 63
End: 2018-08-31

## 2018-09-01 ENCOUNTER — APPOINTMENT (OUTPATIENT)
Dept: RADIOLOGY | Facility: IMAGING CENTER | Age: 63
End: 2018-09-01
Payer: MEDICAID

## 2018-09-01 ENCOUNTER — OUTPATIENT (OUTPATIENT)
Dept: OUTPATIENT SERVICES | Facility: HOSPITAL | Age: 63
LOS: 1 days | End: 2018-09-01
Payer: MEDICAID

## 2018-09-01 ENCOUNTER — APPOINTMENT (OUTPATIENT)
Dept: CT IMAGING | Facility: IMAGING CENTER | Age: 63
End: 2018-09-01
Payer: MEDICAID

## 2018-09-01 DIAGNOSIS — Z98.890 OTHER SPECIFIED POSTPROCEDURAL STATES: Chronic | ICD-10-CM

## 2018-09-01 DIAGNOSIS — M06.9 RHEUMATOID ARTHRITIS, UNSPECIFIED: ICD-10-CM

## 2018-09-01 PROCEDURE — 73130 X-RAY EXAM OF HAND: CPT | Mod: 26,LT,RT

## 2018-09-01 PROCEDURE — 73110 X-RAY EXAM OF WRIST: CPT | Mod: 26,LT,RT

## 2018-09-01 PROCEDURE — 73610 X-RAY EXAM OF ANKLE: CPT | Mod: 26,LT,RT

## 2018-09-01 PROCEDURE — 71250 CT THORAX DX C-: CPT | Mod: 26

## 2018-09-01 PROCEDURE — 71250 CT THORAX DX C-: CPT

## 2018-09-01 PROCEDURE — 73110 X-RAY EXAM OF WRIST: CPT

## 2018-09-01 PROCEDURE — 73630 X-RAY EXAM OF FOOT: CPT

## 2018-09-01 PROCEDURE — 73630 X-RAY EXAM OF FOOT: CPT | Mod: 26,LT,RT

## 2018-09-01 PROCEDURE — 73610 X-RAY EXAM OF ANKLE: CPT

## 2018-09-01 PROCEDURE — 73130 X-RAY EXAM OF HAND: CPT

## 2018-09-07 ENCOUNTER — APPOINTMENT (OUTPATIENT)
Dept: CV DIAGNOSITCS | Facility: HOSPITAL | Age: 63
End: 2018-09-07

## 2018-09-17 ENCOUNTER — LABORATORY RESULT (OUTPATIENT)
Age: 63
End: 2018-09-17

## 2018-09-17 NOTE — ED ADULT NURSE NOTE - CHIEF COMPLAINT QUOTE
Pt arrives to ED c/o back pain that radiates to her chest.  Pain has been going on for 3 days.  Pt has hx of a heart murmur.  Pt hypertensive in triage.  Pt reports she takes HTN meds.  EKG in triage.  Pt is mother of [employer] employee.  Pt upgraded due to ekg results no

## 2018-09-19 LAB
GAMMA INTERFERON BACKGROUND BLD IA-ACNC: 0.16 IU/ML — SIGNIFICANT CHANGE UP
M TB IFN-G BLD-IMP: POSITIVE — SIGNIFICANT CHANGE UP
M TB IFN-G CD4+ BCKGRND COR BLD-ACNC: 0.39 IU/ML — SIGNIFICANT CHANGE UP
M TB IFN-G CD4+CD8+ BCKGRND COR BLD-ACNC: 0.43 IU/ML — SIGNIFICANT CHANGE UP
QUANT TB PLUS MITOGEN MINUS NIL: 8.33 IU/ML — SIGNIFICANT CHANGE UP

## 2018-09-20 DIAGNOSIS — R76.11 NONSPECIFIC REACTION TO TUBERCULIN SKIN TEST W/OUT ACTIVE TUBERCULOSIS: ICD-10-CM

## 2018-10-15 ENCOUNTER — LABORATORY RESULT (OUTPATIENT)
Age: 63
End: 2018-10-15

## 2018-10-15 ENCOUNTER — OUTPATIENT (OUTPATIENT)
Dept: OUTPATIENT SERVICES | Facility: HOSPITAL | Age: 63
LOS: 1 days | End: 2018-10-15

## 2018-10-15 ENCOUNTER — APPOINTMENT (OUTPATIENT)
Dept: RHEUMATOLOGY | Facility: HOSPITAL | Age: 63
End: 2018-10-15
Payer: MEDICAID

## 2018-10-15 VITALS
SYSTOLIC BLOOD PRESSURE: 158 MMHG | BODY MASS INDEX: 32.49 KG/M2 | WEIGHT: 207 LBS | HEIGHT: 67 IN | HEART RATE: 102 BPM | DIASTOLIC BLOOD PRESSURE: 89 MMHG

## 2018-10-15 DIAGNOSIS — R76.11 NONSPECIFIC REACTION TO TUBERCULIN SKIN TEST WITHOUT ACTIVE TUBERCULOSIS: ICD-10-CM

## 2018-10-15 DIAGNOSIS — I10 ESSENTIAL (PRIMARY) HYPERTENSION: ICD-10-CM

## 2018-10-15 DIAGNOSIS — Z98.890 OTHER SPECIFIED POSTPROCEDURAL STATES: Chronic | ICD-10-CM

## 2018-10-15 DIAGNOSIS — Z79.899 OTHER LONG TERM (CURRENT) DRUG THERAPY: ICD-10-CM

## 2018-10-15 DIAGNOSIS — M06.9 RHEUMATOID ARTHRITIS, UNSPECIFIED: ICD-10-CM

## 2018-10-15 DIAGNOSIS — R76.8 OTHER SPECIFIED ABNORMAL IMMUNOLOGICAL FINDINGS IN SERUM: ICD-10-CM

## 2018-10-15 LAB
ALBUMIN SERPL ELPH-MCNC: 4.5 G/DL — SIGNIFICANT CHANGE UP (ref 3.3–5)
ALP SERPL-CCNC: 117 U/L — SIGNIFICANT CHANGE UP (ref 40–120)
ALT FLD-CCNC: 12 U/L — SIGNIFICANT CHANGE UP (ref 4–33)
AST SERPL-CCNC: 15 U/L — SIGNIFICANT CHANGE UP (ref 4–32)
BASOPHILS # BLD AUTO: 0.07 K/UL — SIGNIFICANT CHANGE UP (ref 0–0.2)
BASOPHILS NFR BLD AUTO: 1.3 % — SIGNIFICANT CHANGE UP (ref 0–2)
BILIRUB SERPL-MCNC: 0.2 MG/DL — SIGNIFICANT CHANGE UP (ref 0.2–1.2)
BUN SERPL-MCNC: 10 MG/DL — SIGNIFICANT CHANGE UP (ref 7–23)
CALCIUM SERPL-MCNC: 9.6 MG/DL — SIGNIFICANT CHANGE UP (ref 8.4–10.5)
CHLORIDE SERPL-SCNC: 101 MMOL/L — SIGNIFICANT CHANGE UP (ref 98–107)
CO2 SERPL-SCNC: 25 MMOL/L — SIGNIFICANT CHANGE UP (ref 22–31)
CREAT SERPL-MCNC: 0.61 MG/DL — SIGNIFICANT CHANGE UP (ref 0.5–1.3)
CRP SERPL-MCNC: < 4 MG/L — SIGNIFICANT CHANGE UP
EOSINOPHIL # BLD AUTO: 0.08 K/UL — SIGNIFICANT CHANGE UP (ref 0–0.5)
EOSINOPHIL NFR BLD AUTO: 1.5 % — SIGNIFICANT CHANGE UP (ref 0–6)
ERYTHROCYTE [SEDIMENTATION RATE] IN BLOOD: 38 MM/HR — HIGH (ref 4–25)
GLUCOSE SERPL-MCNC: 173 MG/DL — HIGH (ref 70–99)
HCT VFR BLD CALC: 39.9 % — SIGNIFICANT CHANGE UP (ref 34.5–45)
HGB BLD-MCNC: 12.6 G/DL — SIGNIFICANT CHANGE UP (ref 11.5–15.5)
IMM GRANULOCYTES # BLD AUTO: 0.02 # — SIGNIFICANT CHANGE UP
IMM GRANULOCYTES NFR BLD AUTO: 0.4 % — SIGNIFICANT CHANGE UP (ref 0–1.5)
LYMPHOCYTES # BLD AUTO: 2.97 K/UL — SIGNIFICANT CHANGE UP (ref 1–3.3)
LYMPHOCYTES # BLD AUTO: 55.2 % — HIGH (ref 13–44)
MCHC RBC-ENTMCNC: 28.3 PG — SIGNIFICANT CHANGE UP (ref 27–34)
MCHC RBC-ENTMCNC: 31.6 % — LOW (ref 32–36)
MCV RBC AUTO: 89.5 FL — SIGNIFICANT CHANGE UP (ref 80–100)
MONOCYTES # BLD AUTO: 0.44 K/UL — SIGNIFICANT CHANGE UP (ref 0–0.9)
MONOCYTES NFR BLD AUTO: 8.2 % — SIGNIFICANT CHANGE UP (ref 2–14)
NEUTROPHILS # BLD AUTO: 1.8 K/UL — SIGNIFICANT CHANGE UP (ref 1.8–7.4)
NEUTROPHILS NFR BLD AUTO: 33.4 % — LOW (ref 43–77)
NRBC # FLD: 0 — SIGNIFICANT CHANGE UP
PLATELET # BLD AUTO: 272 K/UL — SIGNIFICANT CHANGE UP (ref 150–400)
PMV BLD: 11.6 FL — SIGNIFICANT CHANGE UP (ref 7–13)
POTASSIUM SERPL-MCNC: 3.9 MMOL/L — SIGNIFICANT CHANGE UP (ref 3.5–5.3)
POTASSIUM SERPL-SCNC: 3.9 MMOL/L — SIGNIFICANT CHANGE UP (ref 3.5–5.3)
PROT SERPL-MCNC: 8.6 G/DL — HIGH (ref 6–8.3)
RBC # BLD: 4.46 M/UL — SIGNIFICANT CHANGE UP (ref 3.8–5.2)
RBC # FLD: 12.6 % — SIGNIFICANT CHANGE UP (ref 10.3–14.5)
SODIUM SERPL-SCNC: 141 MMOL/L — SIGNIFICANT CHANGE UP (ref 135–145)
WBC # BLD: 5.38 K/UL — SIGNIFICANT CHANGE UP (ref 3.8–10.5)
WBC # FLD AUTO: 5.38 K/UL — SIGNIFICANT CHANGE UP (ref 3.8–10.5)

## 2018-10-15 PROCEDURE — 99215 OFFICE O/P EST HI 40 MIN: CPT | Mod: GC

## 2018-10-16 LAB
HBV DNA # SERPL NAA+PROBE: NOT DETECTED IU/ML — SIGNIFICANT CHANGE UP
HBV DNA SERPL NAA+PROBE-LOG#: SIGNIFICANT CHANGE UP LOGIU/ML

## 2019-03-28 ENCOUNTER — RX RENEWAL (OUTPATIENT)
Age: 64
End: 2019-03-28

## 2019-04-08 ENCOUNTER — APPOINTMENT (OUTPATIENT)
Dept: RHEUMATOLOGY | Facility: HOSPITAL | Age: 64
End: 2019-04-08

## 2019-04-15 ENCOUNTER — LABORATORY RESULT (OUTPATIENT)
Age: 64
End: 2019-04-15

## 2019-04-15 ENCOUNTER — OUTPATIENT (OUTPATIENT)
Dept: OUTPATIENT SERVICES | Facility: HOSPITAL | Age: 64
LOS: 1 days | End: 2019-04-15

## 2019-04-15 ENCOUNTER — APPOINTMENT (OUTPATIENT)
Dept: RHEUMATOLOGY | Facility: HOSPITAL | Age: 64
End: 2019-04-15
Payer: MEDICAID

## 2019-04-15 VITALS
DIASTOLIC BLOOD PRESSURE: 92 MMHG | BODY MASS INDEX: 31.86 KG/M2 | HEIGHT: 67 IN | SYSTOLIC BLOOD PRESSURE: 160 MMHG | WEIGHT: 203 LBS

## 2019-04-15 DIAGNOSIS — R76.8 OTHER SPECIFIED ABNORMAL IMMUNOLOGICAL FINDINGS IN SERUM: ICD-10-CM

## 2019-04-15 DIAGNOSIS — Z98.890 OTHER SPECIFIED POSTPROCEDURAL STATES: Chronic | ICD-10-CM

## 2019-04-15 DIAGNOSIS — R91.1 SOLITARY PULMONARY NODULE: ICD-10-CM

## 2019-04-15 DIAGNOSIS — M25.562 PAIN IN LEFT KNEE: ICD-10-CM

## 2019-04-15 DIAGNOSIS — M19.90 UNSPECIFIED OSTEOARTHRITIS, UNSPECIFIED SITE: ICD-10-CM

## 2019-04-15 DIAGNOSIS — M06.9 RHEUMATOID ARTHRITIS, UNSPECIFIED: ICD-10-CM

## 2019-04-15 PROBLEM — R76.11 LATENT TUBERCULOSIS: Status: ACTIVE | Noted: 2018-09-21

## 2019-04-15 LAB
ALBUMIN SERPL ELPH-MCNC: 4.4 G/DL — SIGNIFICANT CHANGE UP (ref 3.3–5)
ALP SERPL-CCNC: 91 U/L — SIGNIFICANT CHANGE UP (ref 40–120)
ALT FLD-CCNC: 13 U/L — SIGNIFICANT CHANGE UP (ref 4–33)
ANION GAP SERPL CALC-SCNC: 14 MMO/L — SIGNIFICANT CHANGE UP (ref 7–14)
AST SERPL-CCNC: 12 U/L — SIGNIFICANT CHANGE UP (ref 4–32)
BASOPHILS # BLD AUTO: 0.05 K/UL — SIGNIFICANT CHANGE UP (ref 0–0.2)
BASOPHILS NFR BLD AUTO: 0.5 % — SIGNIFICANT CHANGE UP (ref 0–2)
BILIRUB SERPL-MCNC: 0.2 MG/DL — SIGNIFICANT CHANGE UP (ref 0.2–1.2)
BUN SERPL-MCNC: 15 MG/DL — SIGNIFICANT CHANGE UP (ref 7–23)
CALCIUM SERPL-MCNC: 10.2 MG/DL — SIGNIFICANT CHANGE UP (ref 8.4–10.5)
CHLORIDE SERPL-SCNC: 102 MMOL/L — SIGNIFICANT CHANGE UP (ref 98–107)
CO2 SERPL-SCNC: 24 MMOL/L — SIGNIFICANT CHANGE UP (ref 22–31)
CREAT SERPL-MCNC: 0.58 MG/DL — SIGNIFICANT CHANGE UP (ref 0.5–1.3)
CRP SERPL-MCNC: < 4 MG/L — SIGNIFICANT CHANGE UP
EOSINOPHIL # BLD AUTO: 0.01 K/UL — SIGNIFICANT CHANGE UP (ref 0–0.5)
EOSINOPHIL NFR BLD AUTO: 0.1 % — SIGNIFICANT CHANGE UP (ref 0–6)
ERYTHROCYTE [SEDIMENTATION RATE] IN BLOOD: 49 MM/HR — HIGH (ref 4–25)
GLUCOSE SERPL-MCNC: 233 MG/DL — HIGH (ref 70–99)
HCT VFR BLD CALC: 38.4 % — SIGNIFICANT CHANGE UP (ref 34.5–45)
HGB BLD-MCNC: 12.2 G/DL — SIGNIFICANT CHANGE UP (ref 11.5–15.5)
IMM GRANULOCYTES NFR BLD AUTO: 0.4 % — SIGNIFICANT CHANGE UP (ref 0–1.5)
LYMPHOCYTES # BLD AUTO: 2.79 K/UL — SIGNIFICANT CHANGE UP (ref 1–3.3)
LYMPHOCYTES # BLD AUTO: 26.5 % — SIGNIFICANT CHANGE UP (ref 13–44)
MCHC RBC-ENTMCNC: 27.9 PG — SIGNIFICANT CHANGE UP (ref 27–34)
MCHC RBC-ENTMCNC: 31.8 % — LOW (ref 32–36)
MCV RBC AUTO: 87.7 FL — SIGNIFICANT CHANGE UP (ref 80–100)
MONOCYTES # BLD AUTO: 0.6 K/UL — SIGNIFICANT CHANGE UP (ref 0–0.9)
MONOCYTES NFR BLD AUTO: 5.7 % — SIGNIFICANT CHANGE UP (ref 2–14)
NEUTROPHILS # BLD AUTO: 7.03 K/UL — SIGNIFICANT CHANGE UP (ref 1.8–7.4)
NEUTROPHILS NFR BLD AUTO: 66.8 % — SIGNIFICANT CHANGE UP (ref 43–77)
NRBC # FLD: 0 K/UL — SIGNIFICANT CHANGE UP (ref 0–0)
PLATELET # BLD AUTO: 280 K/UL — SIGNIFICANT CHANGE UP (ref 150–400)
PMV BLD: 11.4 FL — SIGNIFICANT CHANGE UP (ref 7–13)
POTASSIUM SERPL-MCNC: 3.8 MMOL/L — SIGNIFICANT CHANGE UP (ref 3.5–5.3)
POTASSIUM SERPL-SCNC: 3.8 MMOL/L — SIGNIFICANT CHANGE UP (ref 3.5–5.3)
PROT SERPL-MCNC: 8.3 G/DL — SIGNIFICANT CHANGE UP (ref 6–8.3)
RBC # BLD: 4.38 M/UL — SIGNIFICANT CHANGE UP (ref 3.8–5.2)
RBC # FLD: 13 % — SIGNIFICANT CHANGE UP (ref 10.3–14.5)
SODIUM SERPL-SCNC: 140 MMOL/L — SIGNIFICANT CHANGE UP (ref 135–145)
WBC # BLD: 10.52 K/UL — HIGH (ref 3.8–10.5)
WBC # FLD AUTO: 10.52 K/UL — HIGH (ref 3.8–10.5)

## 2019-04-15 PROCEDURE — 99214 OFFICE O/P EST MOD 30 MIN: CPT | Mod: GC

## 2019-04-15 RX ORDER — CYCLOBENZAPRINE HYDROCHLORIDE 10 MG/1
10 TABLET, FILM COATED ORAL
Qty: 9 | Refills: 0 | Status: DISCONTINUED | COMMUNITY
Start: 2017-06-10 | End: 2019-04-15

## 2019-04-15 RX ORDER — RIFAMPIN 300 MG/1
300 CAPSULE ORAL DAILY
Qty: 60 | Refills: 3 | Status: DISCONTINUED | COMMUNITY
Start: 2018-09-21 | End: 2019-04-15

## 2019-04-15 RX ORDER — ACETAMINOPHEN AND CODEINE 300; 30 MG/1; MG/1
300-30 TABLET ORAL
Qty: 8 | Refills: 0 | Status: DISCONTINUED | COMMUNITY
Start: 2017-03-18 | End: 2019-04-15

## 2019-04-15 RX ORDER — METHYLPRED ACET/NACL,ISO-OS/PF 40 MG/ML
40 VIAL (ML) INJECTION
Qty: 1 | Refills: 0 | Status: COMPLETED | OUTPATIENT
Start: 2019-04-15

## 2019-04-15 RX ORDER — FLUTICASONE PROPIONATE 50 UG/1
50 SPRAY, METERED NASAL
Qty: 16 | Refills: 0 | Status: DISCONTINUED | COMMUNITY
Start: 2017-03-18 | End: 2019-04-15

## 2019-04-15 RX ORDER — LIDOCAINE HYDROCHLORIDE 10 MG/ML
1 INJECTION, SOLUTION INFILTRATION; PERINEURAL
Refills: 0 | Status: COMPLETED | OUTPATIENT
Start: 2019-04-15

## 2019-04-15 RX ADMIN — METHYLPREDNISOLONE ACETATE 1 MG/ML: 40 INJECTION, SUSPENSION INTRA-ARTICULAR; INTRALESIONAL; INTRAMUSCULAR; SOFT TISSUE at 00:00

## 2019-04-15 RX ADMIN — LIDOCAINE HYDROCHLORIDE 0 %: 10 INJECTION, SOLUTION INFILTRATION; PERINEURAL at 00:00

## 2019-04-15 NOTE — PROCEDURE
[Today's Date:] : Date: [unfilled] [Patient] : the patient [Consent Obtained] : written consent was obtained prior to the procedure and is detailed in the patient's record [Therapeutic] : therapeutic [#1 Site: ______] : #1 site identified in the [unfilled] [Ethyl Chloride] : ethyl chloride [Chlorhexidine] : chlorhexidine [22 gauge 1.5 inch] : A 22 gauge 1.5 inch needle was used [___ml 1% Lidocaine] : [unfilled] ml of 1% lidocaine [Depomedrol ___ mg] : Depomedrol [unfilled] mg [Tolerated Well] : the patient tolerated the procedure well [Reports Improvement in Symptoms] : reports improvement in symptoms [No Complications] : there were no complications [Instructions Given] : handouts/patient instructions were given to patient

## 2019-04-15 NOTE — PHYSICAL EXAM
[General Appearance - Well Developed] : well developed [General Appearance - Well Nourished] : well nourished [Neck Cervical Mass (___cm)] : no neck mass was observed [Extraocular Movements] : extraocular movements were intact [Hearing Threshold Finger Rub Not [place]] : hearing was normal [Respiration, Rhythm And Depth] : normal respiratory rhythm and effort [Jugular Venous Distention Increased] : there was no jugular-venous distention [Auscultation Breath Sounds / Voice Sounds] : lungs were clear to auscultation bilaterally [Heart Sounds] : normal S1 and S2 [Cervical Lymph Nodes Enlarged Anterior Bilaterally] : anterior cervical [Edema] : there was no peripheral edema [Cervical Lymph Nodes Enlarged Posterior Bilaterally] : posterior cervical [Supraclavicular Lymph Nodes Enlarged Bilaterally] : supraclavicular [No Focal Deficits] : no focal deficits [] : no rash [General Appearance - Alert] : alert [General Appearance - In No Acute Distress] : in no acute distress [Sclera] : the sclera and conjunctiva were normal [Outer Ear] : the ears and nose were normal in appearance [Neck Appearance] : the appearance of the neck was normal [Abdomen Soft] : soft [Abdomen Tenderness] : non-tender [No Spinal Tenderness] : no spinal tenderness [FreeTextEntry1] : pt with pain on palpation of L knee lateral joint line, crepitus on flexion/extension. Not warm/swollen or erythematous. R knee exam wnl. No MCP/PIP/wrist abnormalities, full ROM. Heberden's nodes b/l

## 2019-04-15 NOTE — HISTORY OF PRESENT ILLNESS
[___ Month(s) Ago] : [unfilled] month(s) ago [FreeTextEntry1] : Pt here w/ daughter today\par Reporting L knee pain worsening x 1 month- pain mostly when climbing stairs or ambulating. Denies any morning stiffness or other joint pains. Denies any fevers/chills/fatigue. \par Remains off therapy for RA. \par \par Daughter says pt went to PMD in 10/2018, noted to have negative Quant Gold

## 2019-04-15 NOTE — REVIEW OF SYSTEMS
[As Noted in HPI] : as noted in HPI [Negative] : Heme/Lymph [Arthralgias] : arthralgias [Joint Pain] : joint pain [Joint Stiffness] : joint stiffness

## 2019-04-15 NOTE — ASSESSMENT
[FreeTextEntry1] : 62 yo F with hx of RA (5/2014 +RF/CCP, non erosive), osteoarthritis, +Quant Gold coming to clinic for f/u \par \par 1) L knee pain/osteoarthritis- pt with isolated L lateral knee pain with xray evidence of osteoarthritis as well. Monoarticular joint pain more likely related to OA rather than RA. Pt's symptoms now c/w RA flare at this time\par - L knee IACS done. See Procedure note\par - PT referral given - PT emphasized \par - Advised use of topical NSAID gels\par \par 2) RA, sero+, nonerosive\par Previously on MTX, Arava, d/c due to side effects. Started on Humira in 2015\par Currently not on any immunosuppressive therapy. Had been on Humira but held due to +Quant\par Pt w/ +Quant Gold, did not complete therapy w/ Rifampin - took it for 2.5 months \par - Will check labs today, repeat Quant\par - Should consider restarting DMARD, possibly MTX or Plaquenil. Pt has not been on therapy for months and has not had return of disease-unclear if needs to go back on TNF\par \par 3) Latent TB- pt with +Quant, CT Chest shows no major lesions, no constitutional symptoms\par Due to risk of being from endemic area and immunosuppresive therapy, plan was to treat however pt did not complete treatment. Given referral for ID at last visit but did not go \par - Recheck Quant, if +, will refer to ID for further evaluation as pt had negative Quant in 10/2018\par \par 4) Pulm Nodules\par - CT Chest 8/2018 shows two solid nodules in RUL stable in size compared to CT 6/2017\par \par 5) HTN- Pt currently on Amlodipine and Metoprolol\par - c/w f/u w/ PMD\par \par 6) systolic murmur\par - had an echo with her cardiologist in the past - not able to recall any abnormality\par - obtain echo now\par \par 7) HMT\par - 8/2018 HBV DNA (h/o + HbsAg) neg, HCV negative, Quantiferon + 9/2018\par - DEXA 2018 - wnl\par - Discuss Prevnar/Pneumovax at next visit \par \par D/w Dr. Higuera\par RTC in  3 months

## 2019-04-18 LAB
GAMMA INTERFERON BACKGROUND BLD IA-ACNC: 0.03 IU/ML — SIGNIFICANT CHANGE UP
M TB IFN-G BLD-IMP: NEGATIVE — SIGNIFICANT CHANGE UP
M TB IFN-G CD4+ BCKGRND COR BLD-ACNC: 0.02 IU/ML — SIGNIFICANT CHANGE UP
M TB IFN-G CD4+CD8+ BCKGRND COR BLD-ACNC: 0.02 IU/ML — SIGNIFICANT CHANGE UP
QUANT TB PLUS MITOGEN MINUS NIL: 8.44 IU/ML — SIGNIFICANT CHANGE UP

## 2019-06-24 ENCOUNTER — APPOINTMENT (OUTPATIENT)
Dept: RHEUMATOLOGY | Facility: CLINIC | Age: 64
End: 2019-06-24
Payer: MEDICAID

## 2019-06-24 ENCOUNTER — OUTPATIENT (OUTPATIENT)
Dept: OUTPATIENT SERVICES | Facility: HOSPITAL | Age: 64
LOS: 1 days | End: 2019-06-24

## 2019-06-24 VITALS
HEIGHT: 67 IN | BODY MASS INDEX: 31.39 KG/M2 | WEIGHT: 200 LBS | DIASTOLIC BLOOD PRESSURE: 68 MMHG | SYSTOLIC BLOOD PRESSURE: 140 MMHG | HEART RATE: 98 BPM | OXYGEN SATURATION: 97 %

## 2019-06-24 DIAGNOSIS — Z98.890 OTHER SPECIFIED POSTPROCEDURAL STATES: Chronic | ICD-10-CM

## 2019-06-24 PROCEDURE — 99213 OFFICE O/P EST LOW 20 MIN: CPT | Mod: GC

## 2019-06-24 RX ORDER — METHYLPRED ACET/NACL,ISO-OS/PF 40 MG/ML
40 VIAL (ML) INJECTION
Qty: 1 | Refills: 0 | Status: COMPLETED | OUTPATIENT
Start: 2019-06-24

## 2019-06-24 RX ADMIN — METHYLPREDNISOLONE ACETATE MG/ML: 40 INJECTION, SUSPENSION INTRA-ARTICULAR; INTRALESIONAL; INTRAMUSCULAR; SOFT TISSUE at 00:00

## 2019-06-25 NOTE — ASSESSMENT
[FreeTextEntry1] : 64 yo F with hx of RA (5/2014 +RF/CCP, non erosive), osteoarthritis, here for f/u visit  \par \par #RA\par CDAI of 19, oligoarthritis, likely RA flare as pt off medications since fall 2018\par -will start on pred 15mg\par -IM depomedrol 40mg injected at bedside\par -will start process for enbrel approval, as pt off humira for months, and likely built ab, and now less effective\par -discussed risk and benefits of enbrel, which are similar to humira\par -Xrays of hands/feet on 8/18 w no erosions\par \par # Pulm Nodules\par - CT Chest 8/2018 shows two solid nodules in RUL stable in size compared to CT 6/2017\par \par \par # HMT\par - 8/2018 HBV DNA (h/o + HbsAg) non detectable, HCV negative, 8/18\par -Quant Gold negative on 4/19, s/p rifampin on fall of 2018 after quant gold positivity \par - DEXA 7/17 no indication for tx, needs repeat soon \par - needs vaccination update \par -need vitamin D level \par \par D/w Dr. Higuera\par RTC in  3 months

## 2019-06-25 NOTE — PHYSICAL EXAM
[General Appearance - Well Nourished] : well nourished [General Appearance - In No Acute Distress] : in no acute distress [General Appearance - Alert] : alert [Sclera] : the sclera and conjunctiva were normal [General Appearance - Well Developed] : well developed [Extraocular Movements] : extraocular movements were intact [Hearing Threshold Finger Rub Not [place]] : hearing was normal [Outer Ear] : the ears and nose were normal in appearance [Neck Appearance] : the appearance of the neck was normal [Neck Cervical Mass (___cm)] : no neck mass was observed [Jugular Venous Distention Increased] : there was no jugular-venous distention [Respiration, Rhythm And Depth] : normal respiratory rhythm and effort [Auscultation Breath Sounds / Voice Sounds] : lungs were clear to auscultation bilaterally [Heart Sounds] : normal S1 and S2 [Edema] : there was no peripheral edema [Cervical Lymph Nodes Enlarged Posterior Bilaterally] : posterior cervical [Abdomen Soft] : soft [Abdomen Tenderness] : non-tender [Cervical Lymph Nodes Enlarged Anterior Bilaterally] : anterior cervical [Supraclavicular Lymph Nodes Enlarged Bilaterally] : supraclavicular [] : no rash [No Spinal Tenderness] : no spinal tenderness [No Focal Deficits] : no focal deficits [Oriented To Time, Place, And Person] : oriented to person, place, and time [FreeTextEntry1] : L knee w swelling, and tenderness.  Pt w FROM but w pain. L ankle swelling. R knee swollen, but less than R.

## 2019-06-26 DIAGNOSIS — M06.9 RHEUMATOID ARTHRITIS, UNSPECIFIED: ICD-10-CM

## 2019-07-15 ENCOUNTER — LABORATORY RESULT (OUTPATIENT)
Age: 64
End: 2019-07-15

## 2019-07-15 ENCOUNTER — APPOINTMENT (OUTPATIENT)
Dept: RHEUMATOLOGY | Facility: CLINIC | Age: 64
End: 2019-07-15
Payer: MEDICAID

## 2019-07-15 ENCOUNTER — OUTPATIENT (OUTPATIENT)
Dept: OUTPATIENT SERVICES | Facility: HOSPITAL | Age: 64
LOS: 1 days | End: 2019-07-15

## 2019-07-15 VITALS
HEART RATE: 93 BPM | SYSTOLIC BLOOD PRESSURE: 160 MMHG | WEIGHT: 206 LBS | DIASTOLIC BLOOD PRESSURE: 74 MMHG | BODY MASS INDEX: 32.33 KG/M2 | HEIGHT: 67 IN

## 2019-07-15 DIAGNOSIS — Z98.890 OTHER SPECIFIED POSTPROCEDURAL STATES: Chronic | ICD-10-CM

## 2019-07-15 DIAGNOSIS — Z23 ENCOUNTER FOR IMMUNIZATION: ICD-10-CM

## 2019-07-15 DIAGNOSIS — Z00.00 ENCOUNTER FOR GENERAL ADULT MEDICAL EXAMINATION WITHOUT ABNORMAL FINDINGS: ICD-10-CM

## 2019-07-15 DIAGNOSIS — Z79.899 OTHER LONG TERM (CURRENT) DRUG THERAPY: ICD-10-CM

## 2019-07-15 DIAGNOSIS — M06.9 RHEUMATOID ARTHRITIS, UNSPECIFIED: ICD-10-CM

## 2019-07-15 PROCEDURE — 99214 OFFICE O/P EST MOD 30 MIN: CPT | Mod: GC

## 2019-07-15 NOTE — PHYSICAL EXAM
[General Appearance - In No Acute Distress] : in no acute distress [General Appearance - Well Nourished] : well nourished [General Appearance - Well Developed] : well developed [Sclera] : the sclera and conjunctiva were normal [Outer Ear] : the ears and nose were normal in appearance [Neck Appearance] : the appearance of the neck was normal [Jugular Venous Distention Increased] : there was no jugular-venous distention [Auscultation Breath Sounds / Voice Sounds] : lungs were clear to auscultation bilaterally [Heart Sounds] : normal S1 and S2 [Abdomen Soft] : soft [Abdomen Tenderness] : non-tender [Cervical Lymph Nodes Enlarged Posterior Bilaterally] : posterior cervical [Supraclavicular Lymph Nodes Enlarged Bilaterally] : supraclavicular [No Spinal Tenderness] : no spinal tenderness [] : no rash [No Focal Deficits] : no focal deficits [FreeTextEntry1] : Knee crepitus b/l. R>L knee mild synovitis without warmth or tenderness. Left sided baker cyst. L>R ankle diffuse swelling without over joint synovitis/erythema/warmth/tenderness. No MCP/PIP/MCP/wrist/shoulder abnormalities. Heberden's nodes b/l. FROM throughout.

## 2019-07-15 NOTE — ASSESSMENT
[FreeTextEntry1] : 63 yo F with hx of RA (seropositive, non erosive), OA knees b/l, L Lewis Cyst, HBV Ab+, LTBI s/p rifampin x2.5mo in 2018, pulmonary nodules, here for follow up. Last seen 6/24/2019 at which time Enbrel was started, pt reports sx improvement.\par \par #RA (seropositive, non-erosive). CDAI 12 today. (CDAI 19 during last visit)\par CDAI of 12, started on Enbrel 6/2019 and on PDN 15mg daily.\par -c/w Prednisone 15mg daily\par -c/w Enbrel qweekly (as pt off humira for many months, and likely built ab, and now less effective)\par -Xrays of hands/feet on 8/18 w no erosions\par -Will repeat Xrays hands/feet b/l and obtain XR knees b/l as well as MRI ankles b/l. Pt has been off tx since 8/18, will need to evaluate for joint disease progression.\par \par #L Lewis Cyst w/ DEBORAH:\par - Will call patient in the near future for invitation to ultrasound clinic to assess clinical findings for educational purposes - patient is agreeable.\par \par # Pulm Nodules\par - CT Chest 8/2018 shows two solid nodules in RUL stable in size compared to CT 6/2017\par \par # HMT\par - 8/2018 HBV DNA (h/o + HbsAg) non detectable, HCV negative, 8/18\par - Check HBV panel today\par - Quant Gold negative on 4/19, s/p rifampin in fall 2018 after quant gold positivity \par - DEXA 7/17 no indication for tx. Ordered for routine follow up at today's visit.\par - PCV 13 today\par - Check Vit D level today\par - Will check with [facility] pharmacy if Shingrix is covered with pt's insurance

## 2019-07-15 NOTE — REVIEW OF SYSTEMS
[Joint Pain] : joint pain [Joint Swelling] : joint swelling [As Noted in HPI] : as noted in HPI [Negative] : Psychiatric [Joint Stiffness] : no joint stiffness

## 2019-07-15 NOTE — HISTORY OF PRESENT ILLNESS
[___ Month(s) Ago] : [unfilled] month(s) ago [FreeTextEntry1] : Todays visit: \par Here for follow up. Pt has been off any dmards since 8/18, now started on qwkly Enbrel - 6/26/19 was her first dose. Also taking PDN 15mg qd since 6/26/19.\par Says she feels better overall since starting Enbrel. \par C/O L knee pain and swelling, L>R ankle swelling without pain. Pt able to ambulate but w pain. Denies trauma, fall, f/c. No rashes, oral ulcers, cough, SOB, night sweats, n/v/d, focal weakness, sensory loss.\par CDAI 12.

## 2019-07-16 LAB
24R-OH-CALCIDIOL SERPL-MCNC: 11.3 NG/ML — LOW (ref 30–80)
HBV CORE IGM SER-ACNC: NONREACTIVE — SIGNIFICANT CHANGE UP
HBV DNA # SERPL NAA+PROBE: NOT DETECTED IU/ML — SIGNIFICANT CHANGE UP
HBV DNA SERPL NAA+PROBE-LOG#: SIGNIFICANT CHANGE UP LOGIU/ML
HBV SURFACE AB SER-ACNC: REACTIVE — SIGNIFICANT CHANGE UP
HBV SURFACE AG SER-ACNC: NONREACTIVE — SIGNIFICANT CHANGE UP

## 2019-07-22 ENCOUNTER — RX RENEWAL (OUTPATIENT)
Age: 64
End: 2019-07-22

## 2019-08-05 ENCOUNTER — OUTPATIENT (OUTPATIENT)
Dept: OUTPATIENT SERVICES | Facility: HOSPITAL | Age: 64
LOS: 1 days | End: 2019-08-05
Payer: MEDICAID

## 2019-08-05 ENCOUNTER — APPOINTMENT (OUTPATIENT)
Dept: RADIOLOGY | Facility: IMAGING CENTER | Age: 64
End: 2019-08-05
Payer: MEDICAID

## 2019-08-05 DIAGNOSIS — Z98.890 OTHER SPECIFIED POSTPROCEDURAL STATES: Chronic | ICD-10-CM

## 2019-08-05 DIAGNOSIS — R01.1 CARDIAC MURMUR, UNSPECIFIED: ICD-10-CM

## 2019-08-05 PROCEDURE — 77080 DXA BONE DENSITY AXIAL: CPT

## 2019-08-05 PROCEDURE — 77080 DXA BONE DENSITY AXIAL: CPT | Mod: 26

## 2019-08-12 ENCOUNTER — OUTPATIENT (OUTPATIENT)
Dept: OUTPATIENT SERVICES | Facility: HOSPITAL | Age: 64
LOS: 1 days | End: 2019-08-12

## 2019-08-12 ENCOUNTER — LABORATORY RESULT (OUTPATIENT)
Age: 64
End: 2019-08-12

## 2019-08-12 ENCOUNTER — CLINICAL ADVICE (OUTPATIENT)
Age: 64
End: 2019-08-12

## 2019-08-12 ENCOUNTER — APPOINTMENT (OUTPATIENT)
Dept: RHEUMATOLOGY | Facility: CLINIC | Age: 64
End: 2019-08-12
Payer: MEDICAID

## 2019-08-12 VITALS
SYSTOLIC BLOOD PRESSURE: 129 MMHG | WEIGHT: 200 LBS | HEART RATE: 97 BPM | BODY MASS INDEX: 31.39 KG/M2 | DIASTOLIC BLOOD PRESSURE: 80 MMHG | OXYGEN SATURATION: 98 % | HEIGHT: 67 IN

## 2019-08-12 DIAGNOSIS — Z98.890 OTHER SPECIFIED POSTPROCEDURAL STATES: Chronic | ICD-10-CM

## 2019-08-12 DIAGNOSIS — Z79.899 OTHER LONG TERM (CURRENT) DRUG THERAPY: ICD-10-CM

## 2019-08-12 DIAGNOSIS — R91.1 SOLITARY PULMONARY NODULE: ICD-10-CM

## 2019-08-12 DIAGNOSIS — M06.9 RHEUMATOID ARTHRITIS, UNSPECIFIED: ICD-10-CM

## 2019-08-12 LAB
ALBUMIN SERPL ELPH-MCNC: 4.2 G/DL — SIGNIFICANT CHANGE UP (ref 3.3–5)
ALP SERPL-CCNC: 85 U/L — SIGNIFICANT CHANGE UP (ref 40–120)
ALT FLD-CCNC: 16 U/L — SIGNIFICANT CHANGE UP (ref 4–33)
ANION GAP SERPL CALC-SCNC: 15 MMO/L — HIGH (ref 7–14)
AST SERPL-CCNC: 12 U/L — SIGNIFICANT CHANGE UP (ref 4–32)
BASOPHILS # BLD AUTO: 0.06 K/UL — SIGNIFICANT CHANGE UP (ref 0–0.2)
BASOPHILS NFR BLD AUTO: 0.7 % — SIGNIFICANT CHANGE UP (ref 0–2)
BILIRUB SERPL-MCNC: 0.3 MG/DL — SIGNIFICANT CHANGE UP (ref 0.2–1.2)
BUN SERPL-MCNC: 16 MG/DL — SIGNIFICANT CHANGE UP (ref 7–23)
CALCIUM SERPL-MCNC: 10.3 MG/DL — SIGNIFICANT CHANGE UP (ref 8.4–10.5)
CHLORIDE SERPL-SCNC: 96 MMOL/L — LOW (ref 98–107)
CO2 SERPL-SCNC: 26 MMOL/L — SIGNIFICANT CHANGE UP (ref 22–31)
CREAT SERPL-MCNC: 0.58 MG/DL — SIGNIFICANT CHANGE UP (ref 0.5–1.3)
CRP SERPL-MCNC: 6.4 MG/L — HIGH
EOSINOPHIL # BLD AUTO: 0.05 K/UL — SIGNIFICANT CHANGE UP (ref 0–0.5)
EOSINOPHIL NFR BLD AUTO: 0.5 % — SIGNIFICANT CHANGE UP (ref 0–6)
ERYTHROCYTE [SEDIMENTATION RATE] IN BLOOD: 51 MM/HR — HIGH (ref 4–25)
GLUCOSE SERPL-MCNC: 285 MG/DL — HIGH (ref 70–99)
HCT VFR BLD CALC: 39.2 % — SIGNIFICANT CHANGE UP (ref 34.5–45)
HGB BLD-MCNC: 12.2 G/DL — SIGNIFICANT CHANGE UP (ref 11.5–15.5)
IMM GRANULOCYTES NFR BLD AUTO: 0.8 % — SIGNIFICANT CHANGE UP (ref 0–1.5)
LYMPHOCYTES # BLD AUTO: 2.67 K/UL — SIGNIFICANT CHANGE UP (ref 1–3.3)
LYMPHOCYTES # BLD AUTO: 29.2 % — SIGNIFICANT CHANGE UP (ref 13–44)
MCHC RBC-ENTMCNC: 28 PG — SIGNIFICANT CHANGE UP (ref 27–34)
MCHC RBC-ENTMCNC: 31.1 % — LOW (ref 32–36)
MCV RBC AUTO: 89.9 FL — SIGNIFICANT CHANGE UP (ref 80–100)
MONOCYTES # BLD AUTO: 0.58 K/UL — SIGNIFICANT CHANGE UP (ref 0–0.9)
MONOCYTES NFR BLD AUTO: 6.3 % — SIGNIFICANT CHANGE UP (ref 2–14)
NEUTROPHILS # BLD AUTO: 5.72 K/UL — SIGNIFICANT CHANGE UP (ref 1.8–7.4)
NEUTROPHILS NFR BLD AUTO: 62.5 % — SIGNIFICANT CHANGE UP (ref 43–77)
NRBC # FLD: 0 K/UL — SIGNIFICANT CHANGE UP (ref 0–0)
PLATELET # BLD AUTO: 258 K/UL — SIGNIFICANT CHANGE UP (ref 150–400)
PMV BLD: 11.8 FL — SIGNIFICANT CHANGE UP (ref 7–13)
POTASSIUM SERPL-MCNC: 3.7 MMOL/L — SIGNIFICANT CHANGE UP (ref 3.5–5.3)
POTASSIUM SERPL-SCNC: 3.7 MMOL/L — SIGNIFICANT CHANGE UP (ref 3.5–5.3)
PROT SERPL-MCNC: 7.8 G/DL — SIGNIFICANT CHANGE UP (ref 6–8.3)
RBC # BLD: 4.36 M/UL — SIGNIFICANT CHANGE UP (ref 3.8–5.2)
RBC # FLD: 13.1 % — SIGNIFICANT CHANGE UP (ref 10.3–14.5)
SODIUM SERPL-SCNC: 137 MMOL/L — SIGNIFICANT CHANGE UP (ref 135–145)
WBC # BLD: 9.15 K/UL — SIGNIFICANT CHANGE UP (ref 3.8–10.5)
WBC # FLD AUTO: 9.15 K/UL — SIGNIFICANT CHANGE UP (ref 3.8–10.5)

## 2019-08-12 PROCEDURE — 99214 OFFICE O/P EST MOD 30 MIN: CPT | Mod: GC

## 2019-08-12 NOTE — REVIEW OF SYSTEMS
[Joint Pain] : joint pain [As Noted in HPI] : as noted in HPI [Negative] : Heme/Lymph [Joint Swelling] : no joint swelling [Joint Stiffness] : no joint stiffness [Limb Swelling] : no limb swelling [Limb Pain] : no limb pain

## 2019-08-12 NOTE — HISTORY OF PRESENT ILLNESS
[___ Week(s) Ago] : [unfilled] week(s) ago [RF] : RF [CCP] : CCP [Previous Medications] : the patient has taken previous medications [Joint Pain] : joint pain [Joint Swelling] : no joint swelling [Presence of Erosions] : no presence of erosions [Morning Stiffness] : no morning stiffness [Rash] : no rash [Chest Pain] : no chest pain [Ocular Symptoms] : no ocular symptoms [Shortness of Breath] : no shortness of breath [Dysphonia] : no dysphonia [Fatigue] : no fatigue [FreeTextEntry1] : Today pt reports feeling ok, has some b/l lateral knee pain.\par Did not get xrays 2/2 auth issue. \par Reports improvement of BP control and less BLLEE since PMD started her on chlorthalidone. [de-identified] : 1 [FreeTextEntry3] : 5/2014 [FreeTextEntry4] : MTX [FreeTextEntry5] : Humira

## 2019-08-12 NOTE — ASSESSMENT
[FreeTextEntry1] : 65 yo F with hx of RA (seropositive, non erosive), OA knees b/l, L Lewis Cyst, LTBI s/p rifampin x2.5mo in 2018, pulmonary nodules, here for follow up. Last seen 6/24/2019 at which time Enbrel was started, pt reports sx improvement.\par \par #RA (seropositive, non-erosive). CDAI-RA low disease activity today (5.5) today. (CDAI 12 during last visit)\par -will start PDN taper as below..\par Prednisone 2.5mg mg pills \par Week 1: 5 pills per day\par Week 2: 4 pills per day\par Week 3: 3 pills per day\par Week 4-follow up: 2 pills per day\par \par -c/w Enbrel qweekly - started 6/15/2019 (as pt off humira for many months, and likely built ab, and now less effective)\par -Xrays of hands/feet on 8/18 w no erosions\par -Pt has been off tx since 8/18 and recently restarted in 6/2019, will need to evaluate for joint disease progression. Xrays ordered for 9/2019. \par - Labs today - CBC, CMP, ESR, CRP\par \par #L Lewis Cyst:\par -continue to monitor\par \par # Pulm Nodules\par - CT Chest 8/2018 shows two solid nodules in RUL stable in size compared to CT 6/2017\par -CT Chest once approved by insurance - please call in 1 week if you have not received a call about insurance authorization. \par \par #Vitamin D deficiency: 25-oh level 11\par -continue ergocalciferol 50,000 units qwkly (started in 7/20/2019)\par \par # HMT\par - 8/2018 HBV DNA (h/o + HbsAg) non detectable, HCV negative, 8/18. HbsAb+ 7/2019\par - Quant Gold negative on 4/19, s/p rifampin in fall 2018 after quant gold positivity \par - DEXA 7/2019 normal, no clinical indication for tx. Next in 2021.\par - PCV 13 7/2019\par - Will check with [facility] pharmacy if Shingrix is covered with pt's insurance\par \par RTC in 6 weeks\par d/w Dr. Higuera

## 2019-08-12 NOTE — PHYSICAL EXAM
[General Appearance - In No Acute Distress] : in no acute distress [General Appearance - Well Nourished] : well nourished [General Appearance - Well Developed] : well developed [Sclera] : the sclera and conjunctiva were normal [Outer Ear] : the ears and nose were normal in appearance [Neck Appearance] : the appearance of the neck was normal [Jugular Venous Distention Increased] : there was no jugular-venous distention [Auscultation Breath Sounds / Voice Sounds] : lungs were clear to auscultation bilaterally [Heart Sounds] : normal S1 and S2 [Abdomen Soft] : soft [Abdomen Tenderness] : non-tender [Supraclavicular Lymph Nodes Enlarged Bilaterally] : supraclavicular [Cervical Lymph Nodes Enlarged Posterior Bilaterally] : posterior cervical [No Spinal Tenderness] : no spinal tenderness [] : no rash [No Focal Deficits] : no focal deficits [Oriented To Time, Place, And Person] : oriented to person, place, and time [FreeTextEntry1] : Knee crepitus b/l. R>L knee mild synovitis without warmth or erythema. TTP over lateral knees b/l. Left sided baker cyst. L>R ankle diffuse swelling without over joint synovitis/erythema/warmth/tenderness. No MCP/PIP/MCP/wrist/shoulder abnormalities. Heberden's nodes b/l. FROM throughout.

## 2019-08-27 ENCOUNTER — APPOINTMENT (OUTPATIENT)
Dept: MRI IMAGING | Facility: IMAGING CENTER | Age: 64
End: 2019-08-27

## 2019-08-27 ENCOUNTER — APPOINTMENT (OUTPATIENT)
Dept: CT IMAGING | Facility: IMAGING CENTER | Age: 64
End: 2019-08-27

## 2019-09-02 ENCOUNTER — EMERGENCY (EMERGENCY)
Facility: HOSPITAL | Age: 64
LOS: 1 days | Discharge: HOME | End: 2019-09-02
Attending: EMERGENCY MEDICINE | Admitting: EMERGENCY MEDICINE
Payer: MEDICAID

## 2019-09-02 VITALS
OXYGEN SATURATION: 100 % | RESPIRATION RATE: 15 BRPM | HEART RATE: 112 BPM | SYSTOLIC BLOOD PRESSURE: 185 MMHG | DIASTOLIC BLOOD PRESSURE: 84 MMHG | TEMPERATURE: 98 F

## 2019-09-02 DIAGNOSIS — Z98.890 OTHER SPECIFIED POSTPROCEDURAL STATES: Chronic | ICD-10-CM

## 2019-09-02 LAB
ALBUMIN SERPL ELPH-MCNC: 4.3 G/DL — SIGNIFICANT CHANGE UP (ref 3.3–5)
ALP SERPL-CCNC: 92 U/L — SIGNIFICANT CHANGE UP (ref 40–120)
ALT FLD-CCNC: 26 U/L — SIGNIFICANT CHANGE UP (ref 4–33)
ANION GAP SERPL CALC-SCNC: 17 MMO/L — HIGH (ref 7–14)
APPEARANCE UR: CLEAR — SIGNIFICANT CHANGE UP
AST SERPL-CCNC: 23 U/L — SIGNIFICANT CHANGE UP (ref 4–32)
B-OH-BUTYR SERPL-SCNC: 0.3 MMOL/L — SIGNIFICANT CHANGE UP (ref 0–0.4)
BACTERIA # UR AUTO: NEGATIVE — SIGNIFICANT CHANGE UP
BASE EXCESS BLDV CALC-SCNC: 3.6 MMOL/L — SIGNIFICANT CHANGE UP
BASOPHILS # BLD AUTO: 0.08 K/UL — SIGNIFICANT CHANGE UP (ref 0–0.2)
BASOPHILS NFR BLD AUTO: 0.9 % — SIGNIFICANT CHANGE UP (ref 0–2)
BILIRUB SERPL-MCNC: 0.4 MG/DL — SIGNIFICANT CHANGE UP (ref 0.2–1.2)
BILIRUB UR-MCNC: NEGATIVE — SIGNIFICANT CHANGE UP
BLOOD GAS VENOUS - CREATININE: 0.47 MG/DL — LOW (ref 0.5–1.3)
BLOOD GAS VENOUS - FIO2: 21 — SIGNIFICANT CHANGE UP
BLOOD UR QL VISUAL: HIGH
BUN SERPL-MCNC: 12 MG/DL — SIGNIFICANT CHANGE UP (ref 7–23)
CALCIUM SERPL-MCNC: 11.1 MG/DL — HIGH (ref 8.4–10.5)
CHLORIDE BLDV-SCNC: 100 MMOL/L — SIGNIFICANT CHANGE UP (ref 96–108)
CHLORIDE SERPL-SCNC: 94 MMOL/L — LOW (ref 98–107)
CO2 SERPL-SCNC: 25 MMOL/L — SIGNIFICANT CHANGE UP (ref 22–31)
COLOR SPEC: SIGNIFICANT CHANGE UP
CREAT SERPL-MCNC: 0.55 MG/DL — SIGNIFICANT CHANGE UP (ref 0.5–1.3)
EOSINOPHIL # BLD AUTO: 0.07 K/UL — SIGNIFICANT CHANGE UP (ref 0–0.5)
EOSINOPHIL NFR BLD AUTO: 0.8 % — SIGNIFICANT CHANGE UP (ref 0–6)
GAS PNL BLDV: 133 MMOL/L — LOW (ref 136–146)
GLUCOSE BLDV-MCNC: 313 MG/DL — HIGH (ref 70–99)
GLUCOSE SERPL-MCNC: 311 MG/DL — HIGH (ref 70–99)
GLUCOSE UR-MCNC: 1000 — HIGH
HBA1C BLD-MCNC: 12.6 % — HIGH (ref 4–5.6)
HCO3 BLDV-SCNC: 26 MMOL/L — SIGNIFICANT CHANGE UP (ref 20–27)
HCT VFR BLD CALC: 42.1 % — SIGNIFICANT CHANGE UP (ref 34.5–45)
HCT VFR BLDV CALC: 44.6 % — SIGNIFICANT CHANGE UP (ref 34.5–45)
HGB BLD-MCNC: 13.6 G/DL — SIGNIFICANT CHANGE UP (ref 11.5–15.5)
HGB BLDV-MCNC: 14.5 G/DL — SIGNIFICANT CHANGE UP (ref 11.5–15.5)
HYALINE CASTS # UR AUTO: NEGATIVE — SIGNIFICANT CHANGE UP
IMM GRANULOCYTES NFR BLD AUTO: 0.6 % — SIGNIFICANT CHANGE UP (ref 0–1.5)
KETONES UR-MCNC: NEGATIVE — SIGNIFICANT CHANGE UP
LACTATE BLDV-MCNC: SIGNIFICANT CHANGE UP MMOL/L (ref 0.5–2)
LEUKOCYTE ESTERASE UR-ACNC: NEGATIVE — SIGNIFICANT CHANGE UP
LYMPHOCYTES # BLD AUTO: 4.01 K/UL — HIGH (ref 1–3.3)
LYMPHOCYTES # BLD AUTO: 46.8 % — HIGH (ref 13–44)
MCHC RBC-ENTMCNC: 28.2 PG — SIGNIFICANT CHANGE UP (ref 27–34)
MCHC RBC-ENTMCNC: 32.3 % — SIGNIFICANT CHANGE UP (ref 32–36)
MCV RBC AUTO: 87.2 FL — SIGNIFICANT CHANGE UP (ref 80–100)
MONOCYTES # BLD AUTO: 1.09 K/UL — HIGH (ref 0–0.9)
MONOCYTES NFR BLD AUTO: 12.7 % — SIGNIFICANT CHANGE UP (ref 2–14)
NEUTROPHILS # BLD AUTO: 3.27 K/UL — SIGNIFICANT CHANGE UP (ref 1.8–7.4)
NEUTROPHILS NFR BLD AUTO: 38.2 % — LOW (ref 43–77)
NITRITE UR-MCNC: NEGATIVE — SIGNIFICANT CHANGE UP
NRBC # FLD: 0 K/UL — SIGNIFICANT CHANGE UP (ref 0–0)
PCO2 BLDV: 47 MMHG — SIGNIFICANT CHANGE UP (ref 41–51)
PH BLDV: 7.39 PH — SIGNIFICANT CHANGE UP (ref 7.32–7.43)
PH UR: 6 — SIGNIFICANT CHANGE UP (ref 5–8)
PLATELET # BLD AUTO: 298 K/UL — SIGNIFICANT CHANGE UP (ref 150–400)
PMV BLD: 11.4 FL — SIGNIFICANT CHANGE UP (ref 7–13)
PO2 BLDV: 31 MMHG — LOW (ref 35–40)
POTASSIUM BLDV-SCNC: 3.4 MMOL/L — SIGNIFICANT CHANGE UP (ref 3.4–4.5)
POTASSIUM SERPL-MCNC: 3.8 MMOL/L — SIGNIFICANT CHANGE UP (ref 3.5–5.3)
POTASSIUM SERPL-SCNC: 3.8 MMOL/L — SIGNIFICANT CHANGE UP (ref 3.5–5.3)
PROT SERPL-MCNC: 8.8 G/DL — HIGH (ref 6–8.3)
PROT UR-MCNC: NEGATIVE — SIGNIFICANT CHANGE UP
RBC # BLD: 4.83 M/UL — SIGNIFICANT CHANGE UP (ref 3.8–5.2)
RBC # FLD: 12.7 % — SIGNIFICANT CHANGE UP (ref 10.3–14.5)
RBC CASTS # UR COMP ASSIST: SIGNIFICANT CHANGE UP (ref 0–?)
SAO2 % BLDV: 52.6 % — LOW (ref 60–85)
SODIUM SERPL-SCNC: 136 MMOL/L — SIGNIFICANT CHANGE UP (ref 135–145)
SP GR SPEC: 1.01 — SIGNIFICANT CHANGE UP (ref 1–1.04)
SQUAMOUS # UR AUTO: SIGNIFICANT CHANGE UP
UROBILINOGEN FLD QL: NORMAL — SIGNIFICANT CHANGE UP
WBC # BLD: 8.57 K/UL — SIGNIFICANT CHANGE UP (ref 3.8–10.5)
WBC # FLD AUTO: 8.57 K/UL — SIGNIFICANT CHANGE UP (ref 3.8–10.5)
WBC UR QL: SIGNIFICANT CHANGE UP (ref 0–?)

## 2019-09-02 PROCEDURE — 99285 EMERGENCY DEPT VISIT HI MDM: CPT

## 2019-09-02 RX ORDER — DEXTROSE 50 % IN WATER 50 %
12.5 SYRINGE (ML) INTRAVENOUS ONCE
Refills: 0 | Status: DISCONTINUED | OUTPATIENT
Start: 2019-09-02 | End: 2019-09-11

## 2019-09-02 RX ORDER — GLUCAGON INJECTION, SOLUTION 0.5 MG/.1ML
1 INJECTION, SOLUTION SUBCUTANEOUS ONCE
Refills: 0 | Status: DISCONTINUED | OUTPATIENT
Start: 2019-09-02 | End: 2019-09-11

## 2019-09-02 RX ORDER — PANTOPRAZOLE SODIUM 20 MG/1
40 TABLET, DELAYED RELEASE ORAL
Refills: 0 | Status: DISCONTINUED | OUTPATIENT
Start: 2019-09-02 | End: 2019-09-11

## 2019-09-02 RX ORDER — INSULIN LISPRO 100/ML
7 VIAL (ML) SUBCUTANEOUS
Refills: 0 | Status: DISCONTINUED | OUTPATIENT
Start: 2019-09-02 | End: 2019-09-11

## 2019-09-02 RX ORDER — DEXTROSE 50 % IN WATER 50 %
15 SYRINGE (ML) INTRAVENOUS ONCE
Refills: 0 | Status: DISCONTINUED | OUTPATIENT
Start: 2019-09-02 | End: 2019-09-11

## 2019-09-02 RX ORDER — SODIUM CHLORIDE 9 MG/ML
1000 INJECTION INTRAMUSCULAR; INTRAVENOUS; SUBCUTANEOUS ONCE
Refills: 0 | Status: COMPLETED | OUTPATIENT
Start: 2019-09-02 | End: 2019-09-02

## 2019-09-02 RX ORDER — INSULIN LISPRO 100/ML
6 VIAL (ML) SUBCUTANEOUS ONCE
Refills: 0 | Status: COMPLETED | OUTPATIENT
Start: 2019-09-02 | End: 2019-09-02

## 2019-09-02 RX ORDER — SODIUM CHLORIDE 9 MG/ML
1000 INJECTION INTRAMUSCULAR; INTRAVENOUS; SUBCUTANEOUS
Refills: 0 | Status: COMPLETED | OUTPATIENT
Start: 2019-09-02 | End: 2019-09-02

## 2019-09-02 RX ORDER — DEXTROSE 50 % IN WATER 50 %
25 SYRINGE (ML) INTRAVENOUS ONCE
Refills: 0 | Status: DISCONTINUED | OUTPATIENT
Start: 2019-09-02 | End: 2019-09-11

## 2019-09-02 RX ORDER — INSULIN LISPRO 100/ML
VIAL (ML) SUBCUTANEOUS
Refills: 0 | Status: DISCONTINUED | OUTPATIENT
Start: 2019-09-02 | End: 2019-09-11

## 2019-09-02 RX ORDER — SODIUM CHLORIDE 9 MG/ML
1000 INJECTION, SOLUTION INTRAVENOUS
Refills: 0 | Status: DISCONTINUED | OUTPATIENT
Start: 2019-09-02 | End: 2019-09-11

## 2019-09-02 RX ORDER — METOPROLOL TARTRATE 50 MG
50 TABLET ORAL DAILY
Refills: 0 | Status: DISCONTINUED | OUTPATIENT
Start: 2019-09-02 | End: 2019-09-11

## 2019-09-02 RX ORDER — INSULIN GLARGINE 100 [IU]/ML
22 INJECTION, SOLUTION SUBCUTANEOUS AT BEDTIME
Refills: 0 | Status: DISCONTINUED | OUTPATIENT
Start: 2019-09-02 | End: 2019-09-11

## 2019-09-02 RX ADMIN — Medication 6 UNIT(S): at 15:20

## 2019-09-02 RX ADMIN — INSULIN GLARGINE 22 UNIT(S): 100 INJECTION, SOLUTION SUBCUTANEOUS at 22:36

## 2019-09-02 RX ADMIN — Medication 7 UNIT(S): at 18:10

## 2019-09-02 RX ADMIN — SODIUM CHLORIDE 1000 MILLILITER(S): 9 INJECTION INTRAMUSCULAR; INTRAVENOUS; SUBCUTANEOUS at 13:39

## 2019-09-02 RX ADMIN — SODIUM CHLORIDE 1000 MILLILITER(S): 9 INJECTION INTRAMUSCULAR; INTRAVENOUS; SUBCUTANEOUS at 15:21

## 2019-09-02 RX ADMIN — SODIUM CHLORIDE 150 MILLILITER(S): 9 INJECTION INTRAMUSCULAR; INTRAVENOUS; SUBCUTANEOUS at 18:10

## 2019-09-02 RX ADMIN — Medication 2: at 18:09

## 2019-09-02 NOTE — ED ADULT TRIAGE NOTE - CHIEF COMPLAINT QUOTE
Pt w/ hx of rheumatoid arthritis DM HTN FS -285 C/o feeling light headed, weak x 2 days and BG reading of 485 at home today.

## 2019-09-02 NOTE — ED PROVIDER NOTE - PROGRESS NOTE DETAILS
ETHEL Weiss: A1C 12.6.. Spoke w/ pt, discussed CDU stay for endocrine eval, insulin/diabetic education. Pt and family ok w/plan.

## 2019-09-02 NOTE — ED PROVIDER NOTE - CLINICAL SUMMARY MEDICAL DECISION MAKING FREE TEXT BOX
63 y/o F PMHx HTN, RA on Prednisone daily, GERD here c/o generalized malaise and fatigue x 1 week, blood glucose >400 at home. Will do labs/beta hydroxy butyrate/blood gas to eval for DKA. Hydration. Insulin. possible cdu for endocrine eval and diabetic education.

## 2019-09-02 NOTE — ED ADULT NURSE NOTE - OBJECTIVE STATEMENT
pt to rm 11 c/o high sugars and increased thirst with freq urination iv inserted to left ac  labs sent off / fluids hung ns

## 2019-09-02 NOTE — ED PROVIDER NOTE - NS ED ROS FT
ROS:  GENERAL: No fever, no chills  EYES: no change in vision  HEENT: no trouble swallowing, no trouble speaking  CARDIAC: no chest pain  PULMONARY: no cough, no shortness of breath  GI: no abdominal pain, no nausea, no vomiting, no diarrhea, no constipation  : No dysuria, no frequency, no change in appearance, or odor of urine  SKIN: no rashes  NEURO: no headache, +generalized weakness, +weight loss  MSK: No joint pain

## 2019-09-02 NOTE — ED PROVIDER NOTE - OBJECTIVE STATEMENT
63 y/o F PMHx HTN, RA on Prednisone daily, GERD here c/o generalized malaise and fatigue x 1 week. Also admits to weight loss of ~8 lbs, polyuria, and polydipsia. Checked her FS today at home (recommended by family to check) and value was >400. Pt denies a hx of diabetes. States she was previously told she is pre-diabetic. Denies fevers, chills, cough, vomiting, dysuria, or any other complaints.

## 2019-09-02 NOTE — ED CDU PROVIDER INITIAL DAY NOTE - MEDICAL DECISION MAKING DETAILS
new onset DM with HbA1c 12.6.  Insulin started, will monitor FS, endocrine consult, fluids, diabetic education

## 2019-09-02 NOTE — ED CDU PROVIDER INITIAL DAY NOTE - OBJECTIVE STATEMENT
65 y/o F PMHx HTN, RA on Prednisone daily, GERD here c/o generalized malaise and fatigue x 1 week. Also admits to weight loss of ~8 lbs, polyuria, and polydipsia. Checked her FS today at home (recommended by family to check) and value was >400. Pt denies a hx of diabetes. States she was previously told she is pre-diabetic. Denies fevers, chills, cough, vomiting, dysuria, or any other complaints.    CDU PA: Agree with above.  Pt with no hx of DM came to ED for elevated glucose, polydipsia and polyuria.  Pt states was not feeling well when her sister who has diabetes check her sugar and noted it was greater than 400.  Pt noted to have HbA1c of 12.6 in ED.  Pt sent to CDU to be started on insulin, endocrine consult, diabetic education and monitor finger sticks.  Pt currently feeling better.  Denies any CP, sob, abd pain, fevers. 63 y/o F PMHx HTN, RA on Prednisone daily, GERD here c/o generalized malaise and fatigue x 1 week. Also admits to weight loss of ~8 lbs, polyuria, and polydipsia. Checked her FS today at home (recommended by family to check) and value was >400. Pt denies a hx of diabetes. States she was previously told she is pre-diabetic. Denies fevers, chills, cough, vomiting, dysuria, or any other complaints.    CDU PA: Agree with above.  Pt with no hx of DM came to ED for elevated glucose, polydipsia and polyuria.  Pt states was not feeling well when her sister who has diabetes check her sugar and noted it was greater than 400.  Pt noted to have HbA1c of 12.6 in ED.  Pt sent to CDU to be started on insulin, endocrine consult, diabetic education and monitor finger sticks.  Pt currently feeling better.  Denies any CP, sob, abd pain, fevers.    Attendinyo female presents with hyperglycemia.  pt was not feeling well and used a family member's maching to check her glucose.

## 2019-09-02 NOTE — ED PROVIDER NOTE - ATTENDING CONTRIBUTION TO CARE
Dr. Garcia: 63 yo female with HTN, RA on prednisone, GERD, in ED with 1 week of fatigue, weight loss and polyuria.  Today pt checked her FS and it was >400.  No CP/SOB, N/V/D, urinary complaints or fever.  On exam pt well appearing, in NAD, heart RRR, lungs CTAB, abd NTND, extremities without swelling, strength 5/5 in all extremities and skin without rash.

## 2019-09-03 VITALS
OXYGEN SATURATION: 98 % | RESPIRATION RATE: 16 BRPM | SYSTOLIC BLOOD PRESSURE: 146 MMHG | TEMPERATURE: 98 F | HEART RATE: 92 BPM | DIASTOLIC BLOOD PRESSURE: 78 MMHG

## 2019-09-03 DIAGNOSIS — I10 ESSENTIAL (PRIMARY) HYPERTENSION: ICD-10-CM

## 2019-09-03 DIAGNOSIS — E55.9 VITAMIN D DEFICIENCY, UNSPECIFIED: ICD-10-CM

## 2019-09-03 DIAGNOSIS — E11.65 TYPE 2 DIABETES MELLITUS WITH HYPERGLYCEMIA: ICD-10-CM

## 2019-09-03 LAB
BACTERIA UR CULT: SIGNIFICANT CHANGE UP
SPECIMEN SOURCE: SIGNIFICANT CHANGE UP

## 2019-09-03 PROCEDURE — 99220: CPT

## 2019-09-03 PROCEDURE — 99285 EMERGENCY DEPT VISIT HI MDM: CPT

## 2019-09-03 RX ORDER — ENOXAPARIN SODIUM 100 MG/ML
20 INJECTION SUBCUTANEOUS
Qty: 5 | Refills: 0
Start: 2019-09-03 | End: 2019-10-02

## 2019-09-03 RX ORDER — INSULIN LISPRO 100/ML
6 VIAL (ML) SUBCUTANEOUS
Qty: 5 | Refills: 0
Start: 2019-09-03 | End: 2019-10-02

## 2019-09-03 RX ORDER — ISOPROPYL ALCOHOL, BENZOCAINE .7; .06 ML/ML; ML/ML
1 SWAB TOPICAL
Qty: 100 | Refills: 1
Start: 2019-09-03 | End: 2019-10-22

## 2019-09-03 RX ADMIN — Medication 3: at 12:59

## 2019-09-03 RX ADMIN — Medication 7 UNIT(S): at 12:59

## 2019-09-03 RX ADMIN — Medication 7 UNIT(S): at 09:11

## 2019-09-03 RX ADMIN — Medication 2: at 09:10

## 2019-09-03 RX ADMIN — Medication 50 MILLIGRAM(S): at 05:41

## 2019-09-03 RX ADMIN — PANTOPRAZOLE SODIUM 40 MILLIGRAM(S): 20 TABLET, DELAYED RELEASE ORAL at 05:40

## 2019-09-03 NOTE — ED CDU PROVIDER DISPOSITION NOTE - ATTENDING CONTRIBUTION TO CARE
Dr. Agarwal:  I performed a face to face bedside interview with patient regarding history of present illness, review of symptoms and past medical history. I completed an independent physical exam.  I have discussed patient's plan of care with PA.   I agree with note as stated above, having amended the EMR as needed to reflect my findings.   This includes HISTORY OF PRESENT ILLNESS, HIV, PAST MEDICAL/SURGICAL/FAMILY/SOCIAL HISTORY, ALLERGIES AND HOME MEDICATIONS, REVIEW OF SYSTEMS, PHYSICAL EXAM, and any PROGRESS NOTES during the time I functioned as the attending physician for this patient.

## 2019-09-03 NOTE — ED CDU PROVIDER SUBSEQUENT DAY NOTE - HISTORY
CDU Initial Day Note: 63 y/o F PMHx HTN, RA on Prednisone daily, GERD here c/o generalized malaise and fatigue x 1 week. Also admits to weight loss of ~8 lbs, polyuria, and polydipsia. Checked her FS today at home (recommended by family to check) and value was >400. Pt denies a hx of diabetes. States she was previously told she is pre-diabetic. Denies fevers, chills, cough, vomiting, dysuria, or any other complaints.  CDU Subsequent Day Note: ETHEL Medel, Agree with above.  Pt with no hx of DM came to ED for elevated glucose, polydipsia and polyuria. HbA1c of 12.6 in ED.  Pt sent to CDU to be started on insulin, endocrine consult, diabetic education and monitor finger sticks. Pt w/ no acute complaints, pending consult with Endo.

## 2019-09-03 NOTE — ED CDU PROVIDER DISPOSITION NOTE - NSFOLLOWUPINSTRUCTIONS_ED_ALL_ED_FT
Follow up with your primary care provider and rheumatologist within one week  Follow up with endocrine within 2 weeks, call [phone] to make an appointment  Take Lantus 20 units at bedtime daily  Take Humalog 6 units 3 times daily with meals  Measure your fingerstick 3 times a day  Stop taking ergocalciferol and chlorthalidone (per endocrine recommendations)  Return to the ER with any worsening or concerning symptoms, weakness, abdominal pain, nausea, fever/chills or any other concerns

## 2019-09-03 NOTE — CONSULT NOTE ADULT - ATTENDING COMMENTS
64F with newly diagnosed DM type 2 and exacerbated by corticosteroid. Patient noted with HbA1c over 7% in 2017 and now worsened on prednisone. She had been on metformin in the past and had possible mild allergic reaction. Patient states her last dose of prednisone was one week ago due to running out. Recommend give dose of prednisone 10mg now to avoid secondary adrenal insufficiency from rapidly withdrawing steroid. Currently BP is mildly high and glucose is high not consistent with adrenal crisis.  Proceed with dc on basal bolus insulin plan as outlined. Anticipate patient will be dc on prednisone 10mg daily (confirm with rheumatology) and will be tapered further as outpatient. She will need to coordinate with pcp and endocrine about decreasing insulin doses as prednisone is further decreased. Suspect she can be managed on oral agent(s) once off prednisone. Reviewed dietary modifications.  Also noted with new mild hypercalcemia which may be due to combination of thiazide diuretic with ergocalciferol high dose repletion. Stop chlorthalidone and ergocalciferol. She can take D3 1000 units daily. Recheck calcium with PTH as outpatient.

## 2019-09-03 NOTE — CONSULT NOTE ADULT - ASSESSMENT
Patient is a 64 year old female with pmh of RA on prednisone for 3 months and hypertension. No previous known DX of DM. In 2017, A1C noted to be 7.4, this admission A1C noted to be 12.6. Endocrine consulted for hyperglycemia on admission. Through stay, FS have been ranging > 200s. Patient placed on 22 units of lantus, 7 units HumaLog premeal and low dose sliding scale HumaLog.      1. Type 2 Diabetes  -target bg 100-180 mg/dl  - Hemoglobin A1C 12.6  - Patient on 15 mg of Prednisone for RA- follow up with Rheumatology for new dose, per patient suppose to be tapered down.  Primary team notified and will contact rheumatology for prednisone plan  - Insulin requirements will decrease as steroid is decreased so important for patient to closely follow with endocrinology and rheumatology  - can c/w current inpatient plan    DC plan:   rheumatology saw patient in August with plans to decrease patient's Prednisone dose to 10 mg PO daily, would discharge patient on Lantus SOLOSTAR pen 20 units sq QHS and HumaLog 6 units sq TID AC.  Please contact endocrine service if this is not the current plan by Rheum    Please send prescriptions for glucometer, and insulin pens to pharmacy for insurance coverage verification  OTHER supplies;  test strips  lancets  alcohol pads  BD mikel pen needles    DM education  - teach glucomter  - teach insulin pens  - teach hypoglycemia s/s and management    2. Hypertension  - SBP in patient 110-150s- patient only on Metoprolol inpatient  - Continue Metoprolol and Norvasc at home  - Stop Chlorthalidone 25 mg due to hypercalcemia and follow-up with PCP Dr. Lawrence  - Would consider adding ACE/ARB for renal protection per primary team    3. Vitamin D Deficiency/hypercalcemia  - Vitamin D, 25-Hydroxy is 11.3  -Stop taking 50,000 IU weekly, switch to 1,000 IU OTC and f/u with PMD    - Calcium 11.1  - Stop taking Chlorthalidone- follow up with PCP when to resume

## 2019-09-03 NOTE — CONSULT NOTE ADULT - SUBJECTIVE AND OBJECTIVE BOX
HPI:    Patient is a 64 year old  female with a history of Rheumatoid Arthritis and hypertension. Patient states she felt weak and dizzy at home, which lead to her family member to check her FS (468 per family), so she came to the hospital. Endocrine consulted for hyperglycemia. Patient denies any previous diagnosis of DM or any family history of DM. Per patient, has been taking 15 mg of Prednisone for approximately 3 months for her RA and was supposed to tapered however patient completed pill bottle last week without tapering and has been off steroids since last week.  Patient states she was previously taking Metformin, but stopped taking it due to side effects (itching)- as per daughter at bedside, took Metformin for 3 months and her sugars returned to normal so the patient stopped taking it. Per patient, average meals include: eggs and white bread for breakfast, and for lunch and dinner, white rice, beans, vegetables and chicken/meat. Patient denies eating snacks during the day, sticks to her three meals, however states she has a bottle of regular soda approximately twice per week. Patient denies any numbness or tingling to extremities and polyphagia, however complains of frequent polyuria and polydipsia. Patient states she sees an opthalmologist twice a year, and denies ever seeing a podiatrist. Patient's a1c noted to be 12.6, in patient was placed on low dose sliding scale TID AC, 7 units of HumaLog TID AC and 22 units of Lantus QHS. Per patient, dose of prednisone to be tapered lower now. Patient's Vitamin D25 (11.3) also noted to be low, along with an elevated Calcium (11.1).   Reports being started on ergocalciferol 50,000units weekly, also started by rheumatology in August.     PAST MEDICAL & SURGICAL HISTORY:  HTN (hypertension)  Rheumatoid arthritis  S/P eye surgery      FAMILY HISTORY:  No pertinent family history in first degree relatives      Social History: Patient denies any alcohol, tobacco or illicit drug use.    Outpatient Medications: Home Medications:  Metoprolol Succinate 50 mg PO daily  Norvasc 5 mg PO daily  Chlorthalidone 25 mg PO daily  Omeprazole DR 40 mg PO daily  Prednisone 15 mg PO daily  Enbrel SQ weekly  Vitamin D (ergocalciferol) 50,000 IU weekly      MEDICATIONS  (STANDING):  dextrose 5%. 1000 milliLiter(s) (50 mL/Hr) IV Continuous <Continuous>  dextrose 50% Injectable 12.5 Gram(s) IV Push once  dextrose 50% Injectable 25 Gram(s) IV Push once  dextrose 50% Injectable 25 Gram(s) IV Push once  insulin glargine Injectable (LANTUS) 22 Unit(s) SubCutaneous at bedtime  insulin lispro (HumaLOG) corrective regimen sliding scale   SubCutaneous three times a day before meals  insulin lispro Injectable (HumaLOG) 7 Unit(s) SubCutaneous three times a day before meals  metoprolol succinate ER 50 milliGRAM(s) Oral daily  pantoprazole    Tablet 40 milliGRAM(s) Oral before breakfast    MEDICATIONS  (PRN):  dextrose 40% Gel 15 Gram(s) Oral once PRN Blood Glucose LESS THAN 70 milliGRAM(s)/deciliter  glucagon  Injectable 1 milliGRAM(s) IntraMuscular once PRN Glucose LESS THAN 70 milligrams/deciliter      Allergies    No Known Allergies    Intolerances      Review of Systems:  Constitutional: No fever  Eyes: No blurry vision  Neuro: No tremors  HEENT: No pain, c/o dry mouth  Cardiovascular: No chest pain, palpitations  Respiratory: No SOB, no cough  GI: No nausea, vomiting, abdominal pain  : No dysuria  Skin: no rash  Psych: no depression  Endocrine: Positive polyuria and polydipsia  Hem/lymph: no swelling      ALL OTHER SYSTEMS REVIEWED AND NEGATIVE      PHYSICAL EXAM:  VITALS: T(C): 36.8 (09-03-19 @ 14:08)  T(F): 98.2 (09-03-19 @ 14:08), Max: 98.4 (09-02-19 @ 15:45)  HR: 92 (09-03-19 @ 14:08) (71 - 97)  BP: 146/78 (09-03-19 @ 14:08) (116/75 - 152/82)  RR:  (16 - 17)  SpO2:  (97% - 100%)  Wt(kg): --  GENERAL: NAD, well-groomed, well-developed  HEENT:  Atraumatic, Normocephalic, moist mucous membranes  THYROID: Normal size, no palpable nodules  RESPIRATORY: Clear to auscultation bilaterally; No rales, rhonchi, wheezing, or rubs  CARDIOVASCULAR: Regular rate and rhythm; No murmurs; no peripheral edema  GI: Soft, nontender, non distended, normal bowel sounds  SKIN: Dry, intact, No rashes or lesions  MUSCULOSKELETAL: Full range of motion, normal strength  NEURO: sensation intact, extraocular movements intact, no tremor, normal reflexes  PSYCH: Alert and oriented x 3, normal affect, normal mood      POCT Blood Glucose.: 293 mg/dL (09-03-19 @ 12:51)  POCT Blood Glucose.: 247 mg/dL (09-03-19 @ 08:50)  POCT Blood Glucose.: 244 mg/dL (09-02-19 @ 22:18)  POCT Blood Glucose.: 238 mg/dL (09-02-19 @ 18:03)  POCT Blood Glucose.: 194 mg/dL (09-02-19 @ 16:02)  POCT Blood Glucose.: 285 mg/dL (09-02-19 @ 12:25)                            13.6   8.57  )-----------( 298      ( 02 Sep 2019 13:34 )             42.1       09-02    136  |  94<L>  |  12  ----------------------------<  311<H>  3.8   |  25  |  0.55    EGFR if : 114  EGFR if non : 98    Ca    11.1<H>      09-02    TPro  8.8<H>  /  Alb  4.3  /  TBili  0.4  /  DBili  x   /  AST  23  /  ALT  26  /  AlkPhos  92  09-02    Vitamin D, 25-Hydroxy (07.15.19 @ 10:35)    Vitamin D, 25-Hydroxy: 11.3: 30 - 80 ng/mL  Optimum Levels (Reference range)  > 80 ng/mL  Toxicity possible  20 - 29 ng/mL  Insufficiency  10 - 19 ng/mL                                        Mild  to Moderate Deficiency  < 10 ng/mL  Severe Deficiency        Hemoglobin A1C, Whole Blood: 12.6 % <H> [4.0 - 5.6] (09-02-19 @ 13:34)

## 2019-09-03 NOTE — ED CDU PROVIDER SUBSEQUENT DAY NOTE - PROGRESS NOTE DETAILS
Pt signed out to me by ETHEL Medel: 64yF w/pmhx RA on prednisone p/w fatigue x 1 week, weight loss, polyuria and polydipsia found to be new onset DM. Pending endocrine consult this morning. No complaints this morning, pt is feeling well Pt seen by endocrine, recommend rheum consult for recommendations on prednisone as pt ran out and was on a taper. Rheum paged. Unable to read rheumatology, reviewed clinic note from 8/12, pt was supposed to start a prednisone taper that was 12.5mg daily week 1, 10mg daily week 2, 7.5mg daily week 3, 5mg daily week 4 and 2.5mg daily week 5. Pt states she did not feel like taking the prednisone so she stopped last week when she ran out her normal dose of 15mg daily. Pt states she has the prednisone for the taper at home and will start the taper tonight at home. Endocrine recommends lantus 20 units at night and humalog 6 units TID before meals. Pts daughter lives with her and will assist in injecting insulin. Endocrine also recommended stopping ergocalciferol and chlorthalidone but recommend taking 1000 units of D3 daily. Discussed this plan with pt who agrees. Paged rheumatology and endocrinology multiple times with no reply. Pt is medically stable for outpt follow up. Will d/c home

## 2019-09-03 NOTE — ED CDU PROVIDER DISPOSITION NOTE - PATIENT PORTAL LINK FT
You can access the FollowMyHealth Patient Portal offered by [facility] by registering at the following website: [email or web address]. By joining [employer]’s FollowMyHealth portal, you will also be able to view your health information using other applications (apps) compatible with our system.

## 2019-09-03 NOTE — ED CDU PROVIDER DISPOSITION NOTE - CLINICAL COURSE
Stefano:  64F h/o HTN, RA on daily prednisone, GERD, presented with ED with fatigue and malaise x 1 week as well as weight loss, polyuria, polydipsia.  Seen by endocrine in CDU for new onset DM, discharge with insulin regimen and follow up outpatient.

## 2019-09-03 NOTE — ED CDU PROVIDER SUBSEQUENT DAY NOTE - ATTENDING CONTRIBUTION TO CARE
Dr. Agarwal:  I performed a face to face bedside interview with patient regarding history of present illness, review of symptoms and past medical history. I completed an independent physical exam.  I have discussed patient's plan of care with PA.   I agree with note as stated above, having amended the EMR as needed to reflect my findings.   This includes HISTORY OF PRESENT ILLNESS, HIV, PAST MEDICAL/SURGICAL/FAMILY/SOCIAL HISTORY, ALLERGIES AND HOME MEDICATIONS, REVIEW OF SYSTEMS, PHYSICAL EXAM, and any PROGRESS NOTES during the time I functioned as the attending physician for this patient.    64F h/o HTN, RA on daily prednisone, GERD, presented with ED with fatigue and malaise x 1 week as well as weight loss, polyuria, polydipsia.  Found to have new onset DM, sent to CDU for endocrine.  Pt with no acute complaints overnight.    Exam:  - nad  - rrr  - ctab  - abd soft ntnd    A/P  - new onset DM  - pending endocrine recs

## 2019-09-16 ENCOUNTER — APPOINTMENT (OUTPATIENT)
Dept: RHEUMATOLOGY | Facility: CLINIC | Age: 64
End: 2019-09-16
Payer: MEDICAID

## 2019-09-16 ENCOUNTER — LABORATORY RESULT (OUTPATIENT)
Age: 64
End: 2019-09-16

## 2019-09-16 ENCOUNTER — OUTPATIENT (OUTPATIENT)
Dept: OUTPATIENT SERVICES | Facility: HOSPITAL | Age: 64
LOS: 1 days | End: 2019-09-16

## 2019-09-16 VITALS — HEART RATE: 81 BPM | WEIGHT: 202 LBS | OXYGEN SATURATION: 97 % | BODY MASS INDEX: 31.71 KG/M2 | HEIGHT: 67 IN

## 2019-09-16 DIAGNOSIS — E11.9 TYPE 2 DIABETES MELLITUS WITHOUT COMPLICATIONS: ICD-10-CM

## 2019-09-16 DIAGNOSIS — M19.90 UNSPECIFIED OSTEOARTHRITIS, UNSPECIFIED SITE: ICD-10-CM

## 2019-09-16 DIAGNOSIS — Z79.899 OTHER LONG TERM (CURRENT) DRUG THERAPY: ICD-10-CM

## 2019-09-16 DIAGNOSIS — M06.9 RHEUMATOID ARTHRITIS, UNSPECIFIED: ICD-10-CM

## 2019-09-16 DIAGNOSIS — Z98.890 OTHER SPECIFIED POSTPROCEDURAL STATES: Chronic | ICD-10-CM

## 2019-09-16 LAB
ALBUMIN SERPL ELPH-MCNC: 4.4 G/DL — SIGNIFICANT CHANGE UP (ref 3.3–5)
ALP SERPL-CCNC: 80 U/L — SIGNIFICANT CHANGE UP (ref 40–120)
ALT FLD-CCNC: 19 U/L — SIGNIFICANT CHANGE UP (ref 4–33)
ANION GAP SERPL CALC-SCNC: 13 MMO/L — SIGNIFICANT CHANGE UP (ref 7–14)
AST SERPL-CCNC: 17 U/L — SIGNIFICANT CHANGE UP (ref 4–32)
BASOPHILS # BLD AUTO: 0.08 K/UL — SIGNIFICANT CHANGE UP (ref 0–0.2)
BASOPHILS NFR BLD AUTO: 1.1 % — SIGNIFICANT CHANGE UP (ref 0–2)
BILIRUB SERPL-MCNC: 0.2 MG/DL — SIGNIFICANT CHANGE UP (ref 0.2–1.2)
BUN SERPL-MCNC: 13 MG/DL — SIGNIFICANT CHANGE UP (ref 7–23)
CALCIUM SERPL-MCNC: 10.3 MG/DL — SIGNIFICANT CHANGE UP (ref 8.4–10.5)
CHLORIDE SERPL-SCNC: 99 MMOL/L — SIGNIFICANT CHANGE UP (ref 98–107)
CO2 SERPL-SCNC: 27 MMOL/L — SIGNIFICANT CHANGE UP (ref 22–31)
CREAT SERPL-MCNC: 0.62 MG/DL — SIGNIFICANT CHANGE UP (ref 0.5–1.3)
EOSINOPHIL # BLD AUTO: 0.15 K/UL — SIGNIFICANT CHANGE UP (ref 0–0.5)
EOSINOPHIL NFR BLD AUTO: 2 % — SIGNIFICANT CHANGE UP (ref 0–6)
GLUCOSE SERPL-MCNC: 214 MG/DL — HIGH (ref 70–99)
HCT VFR BLD CALC: 44.7 % — SIGNIFICANT CHANGE UP (ref 34.5–45)
HGB BLD-MCNC: 13.4 G/DL — SIGNIFICANT CHANGE UP (ref 11.5–15.5)
IMM GRANULOCYTES NFR BLD AUTO: 0.8 % — SIGNIFICANT CHANGE UP (ref 0–1.5)
LYMPHOCYTES # BLD AUTO: 3.78 K/UL — HIGH (ref 1–3.3)
LYMPHOCYTES # BLD AUTO: 50.5 % — HIGH (ref 13–44)
MCHC RBC-ENTMCNC: 27.5 PG — SIGNIFICANT CHANGE UP (ref 27–34)
MCHC RBC-ENTMCNC: 30 % — LOW (ref 32–36)
MCV RBC AUTO: 91.6 FL — SIGNIFICANT CHANGE UP (ref 80–100)
MONOCYTES # BLD AUTO: 0.81 K/UL — SIGNIFICANT CHANGE UP (ref 0–0.9)
MONOCYTES NFR BLD AUTO: 10.8 % — SIGNIFICANT CHANGE UP (ref 2–14)
NEUTROPHILS # BLD AUTO: 2.6 K/UL — SIGNIFICANT CHANGE UP (ref 1.8–7.4)
NEUTROPHILS NFR BLD AUTO: 34.8 % — LOW (ref 43–77)
NRBC # FLD: 0 K/UL — SIGNIFICANT CHANGE UP (ref 0–0)
PLATELET # BLD AUTO: 326 K/UL — SIGNIFICANT CHANGE UP (ref 150–400)
PMV BLD: 11.8 FL — SIGNIFICANT CHANGE UP (ref 7–13)
POTASSIUM SERPL-MCNC: 4.2 MMOL/L — SIGNIFICANT CHANGE UP (ref 3.5–5.3)
POTASSIUM SERPL-SCNC: 4.2 MMOL/L — SIGNIFICANT CHANGE UP (ref 3.5–5.3)
PROT SERPL-MCNC: 8.5 G/DL — HIGH (ref 6–8.3)
RBC # BLD: 4.88 M/UL — SIGNIFICANT CHANGE UP (ref 3.8–5.2)
RBC # FLD: 12.9 % — SIGNIFICANT CHANGE UP (ref 10.3–14.5)
SODIUM SERPL-SCNC: 139 MMOL/L — SIGNIFICANT CHANGE UP (ref 135–145)
WBC # BLD: 7.48 K/UL — SIGNIFICANT CHANGE UP (ref 3.8–10.5)
WBC # FLD AUTO: 7.48 K/UL — SIGNIFICANT CHANGE UP (ref 3.8–10.5)

## 2019-09-16 PROCEDURE — 99214 OFFICE O/P EST MOD 30 MIN: CPT | Mod: GC

## 2019-09-16 NOTE — PHYSICAL EXAM
[General Appearance - Well Developed] : well developed [General Appearance - Well Nourished] : well nourished [General Appearance - In No Acute Distress] : in no acute distress [Outer Ear] : the ears and nose were normal in appearance [Sclera] : the sclera and conjunctiva were normal [Neck Appearance] : the appearance of the neck was normal [Jugular Venous Distention Increased] : there was no jugular-venous distention [Heart Sounds] : normal S1 and S2 [Auscultation Breath Sounds / Voice Sounds] : lungs were clear to auscultation bilaterally [Abdomen Soft] : soft [Cervical Lymph Nodes Enlarged Posterior Bilaterally] : posterior cervical [Abdomen Tenderness] : non-tender [Supraclavicular Lymph Nodes Enlarged Bilaterally] : supraclavicular [No Spinal Tenderness] : no spinal tenderness [] : no rash [No Focal Deficits] : no focal deficits [Oriented To Time, Place, And Person] : oriented to person, place, and time [FreeTextEntry1] : Knee crepitus b/l. b/l knee mild synovitis without warmth or erythema. Left sided baker cyst. b/l ankle diffuse swelling, difficult to assess for synovitis. Heberden's nodes b/l. FROM throughout. b/l wrist synovitis. No TTP/warmth/erythema.

## 2019-09-16 NOTE — REVIEW OF SYSTEMS
[Joint Pain] : joint pain [As Noted in HPI] : as noted in HPI [Negative] : Heme/Lymph [Joint Swelling] : no joint swelling [Joint Stiffness] : no joint stiffness [Limb Pain] : no limb pain [Limb Swelling] : no limb swelling

## 2019-09-16 NOTE — HISTORY OF PRESENT ILLNESS
[___ Week(s) Ago] : [unfilled] week(s) ago [RF] : RF [CCP] : CCP [Previous Medications] : the patient has taken previous medications [Joint Pain] : joint pain [Joint Swelling] : no joint swelling [Presence of Erosions] : no presence of erosions [Rash] : no rash [Morning Stiffness] : no morning stiffness [Ocular Symptoms] : no ocular symptoms [Chest Pain] : no chest pain [Shortness of Breath] : no shortness of breath [Dysphonia] : no dysphonia [Fatigue] : no fatigue [de-identified] : 1 [FreeTextEntry1] : Accompanied by daughter. History obtained from daughter and patient.\par Pt was recently hospitalized for 24hrs for hyperglycemia. \par she was hyperglycemic without steroid, last steroid use was many months ago. she does not take steroid regularly.\par Started taper 1 week ago, currently taking 4 pills of PDN 2.5 mg daily.\par Today pt reports feeling ok, denies any joint pain or swelling.\par Had issues with getting MRI ankles and only got Xrays ankles. NOt Xrays hands/wrists/knees. [FreeTextEntry3] : 5/2014 [FreeTextEntry4] : MTX [FreeTextEntry5] : Humira

## 2019-09-16 NOTE — ASSESSMENT
[FreeTextEntry1] : 65 yo F with hx of RA (seropositive, non erosive), OA knees b/l, L Lewis Cyst, LTBI s/p rifampin x2.5mo in 2018, pulmonary nodules,DM2 A1c 12.6%,  here for follow up. Last seen 6/24/2019 at which time Enbrel was started, pt reports sx improvement.\par \par #RA (seropositive, non-erosive). CDAI-RA low disease activity today.\par -will continue prednisone taper as below.\par -Prednisone 2.5mg mg pills\par -c/w 4 pills a day until this thursday. then start 3 pills a day for 1 week then 2 pills a day for 1 week then 1 pill a day for 1 week. \par \par -c/w Enbrel qweekly - started 6/15/2019 (as pt off humira for many months, and likely built ab, and now less effective)\par -Xrays of hands/feet on 8/18 w no erosions\par -Pt has been off tx since 8/18 and recently restarted in 6/2019, will need to evaluate for joint disease progression. -Xrays hands/wrists/feet and MRI ankles/knees ordered\par - Labs today - CBC, CMP to monitor hyperglycemia\par \par #L Lewis Cyst:\par -continue to monitor\par \par # Pulm Nodules\par - CT Chest 8/2018 shows two solid nodules in RUL stable in size compared to CT 6/2017\par -CT Chest once approved by insurance - please call in 1 week if you have not received a call about insurance authorization. \par \par #Vitamin D deficiency: 25-oh level 11\par -continue ergocalciferol 50,000 units qwkly (started in 7/20/2019)\par \par #DM2 A1c 12.6 - she was hyperglycemic without steroid, last steroid use was many months ago. she does not take steroid regularly.\par -c/w meds as per pmd\par -glycemic optimization especially while on steroid\par \par # HM\par - 8/2018 HBV DNA (h/o + HbsAg) non detectable, HCV negative, 8/18. HbsAb+ 7/2019\par - Quant Gold negative on 4/19, s/p rifampin in fall 2018 after quant gold positivity \par - DEXA 7/2019 normal, no clinical indication for tx. Next in 2021.\par - PCV 13 7/2019\par - Shingrix is covered with pt's insurance - will make appt to receive it\par \par RTC in 6 weeks\par d/w Dr. Higuera

## 2019-09-18 ENCOUNTER — OTHER (OUTPATIENT)
Age: 64
End: 2019-09-18

## 2019-09-25 ENCOUNTER — FORM ENCOUNTER (OUTPATIENT)
Age: 64
End: 2019-09-25

## 2019-09-26 ENCOUNTER — APPOINTMENT (OUTPATIENT)
Dept: RADIOLOGY | Facility: IMAGING CENTER | Age: 64
End: 2019-09-26
Payer: MEDICAID

## 2019-09-26 ENCOUNTER — APPOINTMENT (OUTPATIENT)
Dept: CT IMAGING | Facility: IMAGING CENTER | Age: 64
End: 2019-09-26
Payer: MEDICAID

## 2019-09-26 ENCOUNTER — OUTPATIENT (OUTPATIENT)
Dept: OUTPATIENT SERVICES | Facility: HOSPITAL | Age: 64
LOS: 1 days | End: 2019-09-26
Payer: MEDICAID

## 2019-09-26 DIAGNOSIS — M06.9 RHEUMATOID ARTHRITIS, UNSPECIFIED: ICD-10-CM

## 2019-09-26 DIAGNOSIS — Z98.890 OTHER SPECIFIED POSTPROCEDURAL STATES: Chronic | ICD-10-CM

## 2019-09-26 PROCEDURE — 73130 X-RAY EXAM OF HAND: CPT | Mod: 26,LT,RT

## 2019-09-26 PROCEDURE — 71250 CT THORAX DX C-: CPT | Mod: 26

## 2019-09-26 PROCEDURE — 73110 X-RAY EXAM OF WRIST: CPT | Mod: 26,LT,RT

## 2019-09-26 PROCEDURE — 73630 X-RAY EXAM OF FOOT: CPT | Mod: 26,LT,RT

## 2019-09-26 PROCEDURE — 73610 X-RAY EXAM OF ANKLE: CPT

## 2019-09-26 PROCEDURE — 71250 CT THORAX DX C-: CPT

## 2019-09-26 PROCEDURE — 73610 X-RAY EXAM OF ANKLE: CPT | Mod: 26,LT,RT

## 2019-09-26 PROCEDURE — 73130 X-RAY EXAM OF HAND: CPT

## 2019-09-26 PROCEDURE — 73630 X-RAY EXAM OF FOOT: CPT

## 2019-09-26 PROCEDURE — 73110 X-RAY EXAM OF WRIST: CPT

## 2019-10-03 ENCOUNTER — APPOINTMENT (OUTPATIENT)
Dept: ENDOCRINOLOGY | Facility: CLINIC | Age: 64
End: 2019-10-03

## 2019-10-08 ENCOUNTER — APPOINTMENT (OUTPATIENT)
Dept: MRI IMAGING | Facility: IMAGING CENTER | Age: 64
End: 2019-10-08

## 2019-10-23 ENCOUNTER — FORM ENCOUNTER (OUTPATIENT)
Age: 64
End: 2019-10-23

## 2019-10-24 ENCOUNTER — OUTPATIENT (OUTPATIENT)
Dept: OUTPATIENT SERVICES | Facility: HOSPITAL | Age: 64
LOS: 1 days | End: 2019-10-24
Payer: MEDICAID

## 2019-10-24 ENCOUNTER — APPOINTMENT (OUTPATIENT)
Dept: MRI IMAGING | Facility: IMAGING CENTER | Age: 64
End: 2019-10-24
Payer: MEDICAID

## 2019-10-24 DIAGNOSIS — Z98.890 OTHER SPECIFIED POSTPROCEDURAL STATES: Chronic | ICD-10-CM

## 2019-10-24 DIAGNOSIS — M06.9 RHEUMATOID ARTHRITIS, UNSPECIFIED: ICD-10-CM

## 2019-10-24 PROCEDURE — 73721 MRI JNT OF LWR EXTRE W/O DYE: CPT | Mod: 26,RT,76

## 2019-10-24 PROCEDURE — 73721 MRI JNT OF LWR EXTRE W/O DYE: CPT

## 2019-10-24 PROCEDURE — 73721 MRI JNT OF LWR EXTRE W/O DYE: CPT | Mod: 26,LT

## 2019-11-04 ENCOUNTER — APPOINTMENT (OUTPATIENT)
Dept: ENDOCRINOLOGY | Facility: CLINIC | Age: 64
End: 2019-11-04

## 2019-11-04 ENCOUNTER — LABORATORY RESULT (OUTPATIENT)
Age: 64
End: 2019-11-04

## 2019-11-04 ENCOUNTER — OUTPATIENT (OUTPATIENT)
Dept: OUTPATIENT SERVICES | Facility: HOSPITAL | Age: 64
LOS: 1 days | End: 2019-11-04

## 2019-11-04 ENCOUNTER — APPOINTMENT (OUTPATIENT)
Dept: RHEUMATOLOGY | Facility: CLINIC | Age: 64
End: 2019-11-04
Payer: MEDICAID

## 2019-11-04 VITALS
WEIGHT: 202 LBS | SYSTOLIC BLOOD PRESSURE: 140 MMHG | OXYGEN SATURATION: 95 % | HEART RATE: 94 BPM | BODY MASS INDEX: 31.71 KG/M2 | DIASTOLIC BLOOD PRESSURE: 90 MMHG | HEIGHT: 67 IN

## 2019-11-04 DIAGNOSIS — Z79.899 OTHER LONG TERM (CURRENT) DRUG THERAPY: ICD-10-CM

## 2019-11-04 DIAGNOSIS — R76.8 OTHER SPECIFIED ABNORMAL IMMUNOLOGICAL FINDINGS IN SERUM: ICD-10-CM

## 2019-11-04 DIAGNOSIS — H57.89 OTHER SPECIFIED DISORDERS OF EYE AND ADNEXA: ICD-10-CM

## 2019-11-04 DIAGNOSIS — M06.9 RHEUMATOID ARTHRITIS, UNSPECIFIED: ICD-10-CM

## 2019-11-04 DIAGNOSIS — Z98.890 OTHER SPECIFIED POSTPROCEDURAL STATES: Chronic | ICD-10-CM

## 2019-11-04 DIAGNOSIS — Z23 ENCOUNTER FOR IMMUNIZATION: ICD-10-CM

## 2019-11-04 DIAGNOSIS — Z00.00 ENCOUNTER FOR GENERAL ADULT MEDICAL EXAMINATION WITHOUT ABNORMAL FINDINGS: ICD-10-CM

## 2019-11-04 LAB
ALBUMIN SERPL ELPH-MCNC: 4.4 G/DL — SIGNIFICANT CHANGE UP (ref 3.3–5)
ALP SERPL-CCNC: 75 U/L — SIGNIFICANT CHANGE UP (ref 40–120)
ALT FLD-CCNC: 17 U/L — SIGNIFICANT CHANGE UP (ref 4–33)
ANION GAP SERPL CALC-SCNC: 14 MMO/L — SIGNIFICANT CHANGE UP (ref 7–14)
AST SERPL-CCNC: 15 U/L — SIGNIFICANT CHANGE UP (ref 4–32)
BASOPHILS # BLD AUTO: 0.08 K/UL — SIGNIFICANT CHANGE UP (ref 0–0.2)
BASOPHILS NFR BLD AUTO: 1.1 % — SIGNIFICANT CHANGE UP (ref 0–2)
BILIRUB SERPL-MCNC: 0.3 MG/DL — SIGNIFICANT CHANGE UP (ref 0.2–1.2)
BUN SERPL-MCNC: 9 MG/DL — SIGNIFICANT CHANGE UP (ref 7–23)
CALCIUM SERPL-MCNC: 10.5 MG/DL — SIGNIFICANT CHANGE UP (ref 8.4–10.5)
CHLORIDE SERPL-SCNC: 100 MMOL/L — SIGNIFICANT CHANGE UP (ref 98–107)
CO2 SERPL-SCNC: 26 MMOL/L — SIGNIFICANT CHANGE UP (ref 22–31)
CREAT SERPL-MCNC: 0.57 MG/DL — SIGNIFICANT CHANGE UP (ref 0.5–1.3)
CRP SERPL-MCNC: < 4 MG/L — SIGNIFICANT CHANGE UP
EOSINOPHIL # BLD AUTO: 0.15 K/UL — SIGNIFICANT CHANGE UP (ref 0–0.5)
EOSINOPHIL NFR BLD AUTO: 2.1 % — SIGNIFICANT CHANGE UP (ref 0–6)
ERYTHROCYTE [SEDIMENTATION RATE] IN BLOOD: 53 MM/HR — HIGH (ref 4–25)
GLUCOSE SERPL-MCNC: 218 MG/DL — HIGH (ref 70–99)
HCT VFR BLD CALC: 40.3 % — SIGNIFICANT CHANGE UP (ref 34.5–45)
HCV AB S/CO SERPL IA: 0.24 S/CO — SIGNIFICANT CHANGE UP (ref 0–0.99)
HCV AB SERPL-IMP: SIGNIFICANT CHANGE UP
HGB BLD-MCNC: 12.5 G/DL — SIGNIFICANT CHANGE UP (ref 11.5–15.5)
IMM GRANULOCYTES NFR BLD AUTO: 0.1 % — SIGNIFICANT CHANGE UP (ref 0–1.5)
LYMPHOCYTES # BLD AUTO: 3.52 K/UL — HIGH (ref 1–3.3)
LYMPHOCYTES # BLD AUTO: 49.2 % — HIGH (ref 13–44)
MCHC RBC-ENTMCNC: 27.8 PG — SIGNIFICANT CHANGE UP (ref 27–34)
MCHC RBC-ENTMCNC: 31 % — LOW (ref 32–36)
MCV RBC AUTO: 89.8 FL — SIGNIFICANT CHANGE UP (ref 80–100)
MONOCYTES # BLD AUTO: 0.68 K/UL — SIGNIFICANT CHANGE UP (ref 0–0.9)
MONOCYTES NFR BLD AUTO: 9.5 % — SIGNIFICANT CHANGE UP (ref 2–14)
NEUTROPHILS # BLD AUTO: 2.71 K/UL — SIGNIFICANT CHANGE UP (ref 1.8–7.4)
NEUTROPHILS NFR BLD AUTO: 38 % — LOW (ref 43–77)
NRBC # FLD: 0 K/UL — SIGNIFICANT CHANGE UP (ref 0–0)
PLATELET # BLD AUTO: 291 K/UL — SIGNIFICANT CHANGE UP (ref 150–400)
PMV BLD: 12.2 FL — SIGNIFICANT CHANGE UP (ref 7–13)
POTASSIUM SERPL-MCNC: 3.9 MMOL/L — SIGNIFICANT CHANGE UP (ref 3.5–5.3)
POTASSIUM SERPL-SCNC: 3.9 MMOL/L — SIGNIFICANT CHANGE UP (ref 3.5–5.3)
PROT SERPL-MCNC: 8.6 G/DL — HIGH (ref 6–8.3)
RBC # BLD: 4.49 M/UL — SIGNIFICANT CHANGE UP (ref 3.8–5.2)
RBC # FLD: 12.9 % — SIGNIFICANT CHANGE UP (ref 10.3–14.5)
SODIUM SERPL-SCNC: 140 MMOL/L — SIGNIFICANT CHANGE UP (ref 135–145)
WBC # BLD: 7.15 K/UL — SIGNIFICANT CHANGE UP (ref 3.8–10.5)
WBC # FLD AUTO: 7.15 K/UL — SIGNIFICANT CHANGE UP (ref 3.8–10.5)

## 2019-11-04 PROCEDURE — 99214 OFFICE O/P EST MOD 30 MIN: CPT | Mod: GC

## 2019-11-04 NOTE — HISTORY OF PRESENT ILLNESS
[RF] : RF [CCP] : CCP [Previous Medications] : the patient has taken previous medications [Joint Pain] : joint pain [Presence of Erosions] : no presence of erosions [Joint Swelling] : no joint swelling [Morning Stiffness] : no morning stiffness [Rash] : no rash [Ocular Symptoms] : no ocular symptoms [Chest Pain] : no chest pain [Shortness of Breath] : no shortness of breath [Dysphonia] : no dysphonia [Fatigue] : no fatigue [de-identified] : 9/16/2019 [FreeTextEntry1] : Accompanied by daughter. History obtained from daughter and patient.\par Last PDN taper use Sept-oct 2019. Currently off steroids.\par Today pt reports feeling ok, denies any joint pain or swelling.\par 10/2019 - MRI ankles b/l reviewed - mild tenosynovitis with early TMT joint changes which may be OA related\par Also has eye redness, itching, and some clear discharge b/l. Says she gets seasonal allergies, usually takes zytrec around this time but hasn’t started yet. Denies visual disturbances, temporary vision loss, HA, eye pain, temporal/scalp pain, jaw claudication. [FreeTextEntry3] : 5/2014 [FreeTextEntry4] : MTX [FreeTextEntry5] : Humira

## 2019-11-04 NOTE — ASSESSMENT
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 Last seen 6/24/2019 at which time Enbrel was started, pt reports sx improvement.\par \par #RA (seropositive, non-erosive). CDAI-RA low disease activity today (6.5 points).\par [] c/w Enbrel qweekly - started 6/15/2019 \par [] Xrays of hands/feet on 8/18 w no erosions\par [] MRI ankles b/l 10/2019- mild tenosynovitis with early TMT joint changes which may be OA related.  \par [] Despite mild tenosynovitis on MRI, overall low disease activity and will continue current regimen and monitor for need to accelerate care. \par \par # red eye - c/w allergic conjunctivitis; based on clinical picture and history. \par [] not consistent with uveitis or scleritis but will monitor \par [] will use allergy medication (zyrtec) and monitor\par [] if persistent sx, pt will need further w/u and optho referral\par \par #L Lewis Cyst:\par -continue to monitor\par \par # Pulm Nodules\par -CT Cheat 9/2019 - No significant interval change since CT chest dated 9/1/2018. Stable right upper lobe pulmonary nodules. \par -PFTs essentially normal as per pulm\par -1 yr CT chest follow up - will be due 9/2020\par \par #Vitamin D deficiency: 25-oh level 11\par -s/p ergocalciferol 50,000 units qwkly x 8 wks (completed in last sept 2019)\par \par #DM2 A1c 12.6 - she was hyperglycemic without steroid, last steroid use was many months ago. she does not take steroid regularly.\par -c/w meds as per pmd\par -glycemic optimization especially while on steroid\par \par # Hep B Core positive \par [] check Hep B PCR Q 3 months \par \par # HM\par [] 8/2018 HBV DNA (h/o + HbsAg) non detectable, HCV negative, 8/18. HbsAb+ 7/2019\par [] Quant Gold negative on 4/19, s/p rifampin in fall 2018 after quant gold positivity \par [] DEXA 7/2019 normal, no clinical indication for tx. Next in 2021.\par [] PCV 13 7/2019 and will give PPSV today\par [] Shingrix is covered with pt's insurance - will make appt to receive it (will discuss with patient) \par [] check hep C ab (last checked in 8-2018 and negative)\par []flu shot today\par \par RTC in 3 months\par d/w Dr. Higuera

## 2019-11-04 NOTE — PHYSICAL EXAM
[General Appearance - In No Acute Distress] : in no acute distress [General Appearance - Well Nourished] : well nourished [General Appearance - Well Developed] : well developed [Sclera] : the sclera and conjunctiva were normal [Outer Ear] : the ears and nose were normal in appearance [Neck Appearance] : the appearance of the neck was normal [Jugular Venous Distention Increased] : there was no jugular-venous distention [Auscultation Breath Sounds / Voice Sounds] : lungs were clear to auscultation bilaterally [Heart Sounds] : normal S1 and S2 [Abdomen Soft] : soft [Abdomen Tenderness] : non-tender [Cervical Lymph Nodes Enlarged Posterior Bilaterally] : posterior cervical [Supraclavicular Lymph Nodes Enlarged Bilaterally] : supraclavicular [No Spinal Tenderness] : no spinal tenderness [] : no rash [No Focal Deficits] : no focal deficits [Oriented To Time, Place, And Person] : oriented to person, place, and time [FreeTextEntry1] : Knee crepitus b/l. b/l knee mild synovitis without warmth or erythema. Left sided baker cyst. b/l ankle diffuse swelling, difficult to assess for synovitis. Heberden's nodes b/l. FROM throughout. b/l wrist synovitis. No TTP/warmth/erythema.

## 2020-02-24 ENCOUNTER — APPOINTMENT (OUTPATIENT)
Dept: RHEUMATOLOGY | Facility: CLINIC | Age: 65
End: 2020-02-24

## 2020-09-16 ENCOUNTER — OUTPATIENT (OUTPATIENT)
Dept: OUTPATIENT SERVICES | Facility: HOSPITAL | Age: 65
LOS: 1 days | End: 2020-09-16
Payer: MEDICARE

## 2020-09-16 ENCOUNTER — APPOINTMENT (OUTPATIENT)
Dept: MRI IMAGING | Facility: IMAGING CENTER | Age: 65
End: 2020-09-16
Payer: MEDICARE

## 2020-09-16 ENCOUNTER — RESULT REVIEW (OUTPATIENT)
Age: 65
End: 2020-09-16

## 2020-09-16 DIAGNOSIS — Z00.8 ENCOUNTER FOR OTHER GENERAL EXAMINATION: ICD-10-CM

## 2020-09-16 DIAGNOSIS — Z98.890 OTHER SPECIFIED POSTPROCEDURAL STATES: Chronic | ICD-10-CM

## 2020-09-16 PROCEDURE — A9585: CPT

## 2020-09-16 PROCEDURE — 70553 MRI BRAIN STEM W/O & W/DYE: CPT | Mod: 26

## 2020-09-16 PROCEDURE — 70553 MRI BRAIN STEM W/O & W/DYE: CPT

## 2020-10-12 ENCOUNTER — RX RENEWAL (OUTPATIENT)
Age: 65
End: 2020-10-12

## 2020-12-14 ENCOUNTER — OUTPATIENT (OUTPATIENT)
Dept: OUTPATIENT SERVICES | Facility: HOSPITAL | Age: 65
LOS: 1 days | End: 2020-12-14

## 2020-12-14 ENCOUNTER — APPOINTMENT (OUTPATIENT)
Dept: ULTRASOUND IMAGING | Facility: IMAGING CENTER | Age: 65
End: 2020-12-14
Payer: MEDICARE

## 2020-12-14 ENCOUNTER — RESULT REVIEW (OUTPATIENT)
Age: 65
End: 2020-12-14

## 2020-12-14 ENCOUNTER — APPOINTMENT (OUTPATIENT)
Dept: RHEUMATOLOGY | Facility: CLINIC | Age: 65
End: 2020-12-14

## 2020-12-14 ENCOUNTER — OUTPATIENT (OUTPATIENT)
Dept: OUTPATIENT SERVICES | Facility: HOSPITAL | Age: 65
LOS: 1 days | End: 2020-12-14
Payer: MEDICARE

## 2020-12-14 VITALS
SYSTOLIC BLOOD PRESSURE: 150 MMHG | HEIGHT: 68 IN | DIASTOLIC BLOOD PRESSURE: 60 MMHG | BODY MASS INDEX: 31.67 KG/M2 | HEART RATE: 121 BPM | WEIGHT: 209 LBS | OXYGEN SATURATION: 96 %

## 2020-12-14 VITALS — TEMPERATURE: 97.7 F

## 2020-12-14 DIAGNOSIS — Z98.890 OTHER SPECIFIED POSTPROCEDURAL STATES: Chronic | ICD-10-CM

## 2020-12-14 DIAGNOSIS — M06.9 RHEUMATOID ARTHRITIS, UNSPECIFIED: ICD-10-CM

## 2020-12-14 PROCEDURE — 93970 EXTREMITY STUDY: CPT

## 2020-12-14 PROCEDURE — 93970 EXTREMITY STUDY: CPT | Mod: 26

## 2020-12-14 NOTE — DATA REVIEWED
[FreeTextEntry1] : Laboratory and radiology studies that were personally reviewed at today's visit are summarized above:

## 2020-12-14 NOTE — PHYSICAL EXAM
[General Appearance - In No Acute Distress] : in no acute distress [General Appearance - Well Nourished] : well nourished [General Appearance - Well Developed] : well developed [Sclera] : the sclera and conjunctiva were normal [Outer Ear] : the ears and nose were normal in appearance [Neck Appearance] : the appearance of the neck was normal [Jugular Venous Distention Increased] : there was no jugular-venous distention [Auscultation Breath Sounds / Voice Sounds] : lungs were clear to auscultation bilaterally [Heart Sounds] : normal S1 and S2 [Abdomen Soft] : soft [Abdomen Tenderness] : non-tender [Cervical Lymph Nodes Enlarged Posterior Bilaterally] : posterior cervical [Supraclavicular Lymph Nodes Enlarged Bilaterally] : supraclavicular [No Spinal Tenderness] : no spinal tenderness [] : no rash [No Focal Deficits] : no focal deficits [Oriented To Time, Place, And Person] : oriented to person, place, and time [FreeTextEntry1] : Knee crepitus b/l. b/l knee mild synovitis without warmth or erythema. Left sided baker cyst. L shoulder with pain on external rotation.

## 2020-12-14 NOTE — REASON FOR VISIT
[Follow-Up: _____] : a [unfilled] follow-up visit [FreeTextEntry1] : Rheumatoid Arthritis, High Risk Medication Use

## 2020-12-14 NOTE — HISTORY OF PRESENT ILLNESS
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 No swelling.\par Cannot raise shoulder well and pain with movement and worse when sleeping on it.\par L knee worse with activity, improves with rest.\par Takes motrin PRN with some relief.\par Puts lidoderm patches on it. Got some from son.\par No morning stiffness.\par \par ===========\par Hx:\par Pt with RA (5/2014 +RF/CCP, non erosive) and mild OA of knees. Initially treated with MTX but stopped given urticarial rash. Leflunomide was started but pt had an intolerance (GI AE). Initiated on Humira with good response. Disease in remission. \par Antibody Profile: RF, CCP. Previous Non-Biologic DMARD: mtx (d/c due to urticarial rash), Arava (d/c with gi intolerance). \par GI cleared for Prior hep B infection - PCR 8/2018 - not detected\par 3/2017, pt s/p L knee arthrocentesis and injection with depomedrol with improvement in symptoms. \par 8/2018 - xray hand and foot: no erosions; calcaneal enthesopathy\par \par Around fall 2018, pt was found to have Quant Gold positivity. No pulm CT symptoms. CT chest negative for active dz. Pt took rifampin for 2.5/4 months, but stopped as pt could not tolerate. \par During time, pt had no RA activity, d/ashlee off humira after being on it since 2015. \par \par By 4/19, pt had L knee pain, was attributed to OA. Had L knee glucocorticoid injection. \par \par 6/26/2019 - started on Enbrel qwkly (as pt off humira for many months, and likely built ab, and now less effective). Was also started on PDN 15mg qd and received IM depomedrol 40mg at bedside.\par \par 10/2019: MRI ankles b/l reviewed - mild tenosynovitis with early TMT joint changes which may be OA related [Presence of Erosions] : no presence of erosions [Joint Swelling] : no joint swelling [Morning Stiffness] : no morning stiffness [Rash] : no rash [Ocular Symptoms] : no ocular symptoms [Chest Pain] : no chest pain [Shortness of Breath] : no shortness of breath [Dysphonia] : no dysphonia [Fatigue] : no fatigue [FreeTextEntry3] : 5/2014 [FreeTextEntry4] : MTX [FreeTextEntry5] : Humira [FreeTextEntry6] : On enbrel since 6/2019

## 2020-12-14 NOTE — ASSESSMENT
[FreeTextEntry1] : 64 yo F with hx of RA (seropositive, non erosive), OA knees b/l, L Lewis Cyst, LTBI s/p rifampin x2.5mo in 2018, pulmonary nodules,DM2, here for follow up. \par \par \par #RA (seropositive, non-erosive). CDAI-RA low disease activity\par Will check laboratory tests to look for markers of disease activity and also to assess for medication toxicity.\par continue current medications. \par [] c/w Enbrel Qweekly - started 6/15/2019 \par [] Xrays of hands/feet on 9/2019 w no erosions - repeat in 9-2021 planned. \par [] MRI ankles b/l 10/2019- mild tenosynovitis with early TMT joint changes which may be OA related.  \par [] Despite mild tenosynovitis on MRI, overall low disease activity and will continue current regimen and monitor for need to accelerate care. \par [] Last Dexa 8-2019 (Normal) - repeat DEXA due 8-2021\par [] Will check laboratory tests to look for markers of disease activity and also to assess for medication toxicity.\par [] check Hep C/Qgold today (prior Q gold in 4-2019 negative but history of treated latent TB) \par [] check hep B PCR today \par \par # Shoulder Pain - differential ds - structural disease vs inflammatory disease. \par [] check ulstrasound as below \par \par # Left sided Leg Swelling - concern for bakers cyst rupture vs DVT \par [] check expedited LE US. \par \par #OA knees b/l\par [] once DVT / ruptured baker cyst ruled out, consider arthrocentesis/IACS injection at next visit\par \par # Pulm Nodules\par -CT Chest 9/2019 - No significant interval change since CT chest dated 9/1/2018. Stable right upper lobe pulmonary nodules. \par -PFTs essentially normal as per pulm\par -1 yr CT chest follow up - will be due 9/2020 and will order today \par \par #Vitamin D deficiency: 25-oh level 11 with last labs\par -check today\par \par #DM2 A1c poorly controlled\par -c/w meds as per pmd\par -glycemic optimization with PMD and or endo. \par \par # Hep B Core positive \par [] check Hep B PCR Q 3 months \par \par # HM\par [] 8/2018 HBV DNA (h/o + HbsAg) non detectable, HCV negative, 8/18. HbsAb+ 7/2019\par [] Quant Gold negative on 4/19, s/p rifampin in fall 2018 after quant gold positivity \par [] DEXA 7/2019 normal, no clinical indication for tx. Next in 2021.\par [] PCV 13 7/2019 and will give PPSV today\par [] Shingrix is covered with pt's insurance - will make appt to receive it (will discuss with patient) \par [] check hep C ab (last checked in 8-2018 and negative)\par []flu shot 11/2019\par \par \par \par More than 50% of the encounter was spent counselling the patient on differential, workup, disease course, and treatment/management.   Education was provided to the patient during this encounter. All questions and concerns were answered and addressed in detail. The patient verbalized understanding and agreed to plan\par \par \par Patient has been instructed to make a follow up appointment in 3 months \par Patient has been instructed to call for an earlier appointment if new symptoms develop in the interim\par \par \par \par \par Patient has been instructed to contact me if she is in close contact with a person with COVID-19 or if she develops symptoms consistent with COVID-19.  Given the medication prescribed to this patient, it may be necessary to alter her medications in the event of exposure to COVID19, or if the patient has developed COVID-19/COVID-19 like symptoms.  (Vickie TR, Saurabh SR, Arianne L, Georgina RJ, Castillo LR, Diego BL, et al. American College of Rheumat[employer] Guidance for the Management of Rheumatic Disease in Adult Patients During the COVID-19 Pandemic: Version 2. Arthritis Rheumatol 2020; 72; e1-e12.)\par \par

## 2020-12-15 DIAGNOSIS — M25.519 PAIN IN UNSPECIFIED SHOULDER: ICD-10-CM

## 2020-12-15 DIAGNOSIS — Z79.899 OTHER LONG TERM (CURRENT) DRUG THERAPY: ICD-10-CM

## 2020-12-15 DIAGNOSIS — M19.90 UNSPECIFIED OSTEOARTHRITIS, UNSPECIFIED SITE: ICD-10-CM

## 2020-12-15 DIAGNOSIS — M06.9 RHEUMATOID ARTHRITIS, UNSPECIFIED: ICD-10-CM

## 2021-01-25 ENCOUNTER — APPOINTMENT (OUTPATIENT)
Dept: RHEUMATOLOGY | Facility: CLINIC | Age: 66
End: 2021-01-25

## 2021-02-19 NOTE — ED CDU PROVIDER SUBSEQUENT DAY NOTE - GASTROINTESTINAL NEGATIVE STATEMENT, MLM
denies pain/discomfort no abdominal pain, no bloating, no constipation, no diarrhea, no nausea and no vomiting.

## 2021-03-30 ENCOUNTER — APPOINTMENT (OUTPATIENT)
Dept: DISASTER EMERGENCY | Facility: OTHER | Age: 66
End: 2021-03-30

## 2021-04-20 ENCOUNTER — APPOINTMENT (OUTPATIENT)
Dept: DISASTER EMERGENCY | Facility: OTHER | Age: 66
End: 2021-04-20

## 2021-07-26 ENCOUNTER — RESULT REVIEW (OUTPATIENT)
Age: 66
End: 2021-07-26

## 2021-07-26 ENCOUNTER — APPOINTMENT (OUTPATIENT)
Dept: RHEUMATOLOGY | Facility: CLINIC | Age: 66
End: 2021-07-26
Payer: MEDICARE

## 2021-07-26 ENCOUNTER — OUTPATIENT (OUTPATIENT)
Dept: OUTPATIENT SERVICES | Facility: HOSPITAL | Age: 66
LOS: 1 days | End: 2021-07-26

## 2021-07-26 VITALS
BODY MASS INDEX: 28.04 KG/M2 | OXYGEN SATURATION: 99 % | HEART RATE: 90 BPM | RESPIRATION RATE: 18 BRPM | WEIGHT: 185 LBS | HEIGHT: 68 IN | DIASTOLIC BLOOD PRESSURE: 64 MMHG | SYSTOLIC BLOOD PRESSURE: 130 MMHG

## 2021-07-26 VITALS — TEMPERATURE: 98.3 F

## 2021-07-26 DIAGNOSIS — Z98.890 OTHER SPECIFIED POSTPROCEDURAL STATES: Chronic | ICD-10-CM

## 2021-07-26 PROCEDURE — 99214 OFFICE O/P EST MOD 30 MIN: CPT | Mod: GC

## 2021-07-27 NOTE — PHYSICAL EXAM
[General Appearance - Alert] : alert [General Appearance - Well Nourished] : well nourished [Sclera] : the sclera and conjunctiva were normal [Auscultation Breath Sounds / Voice Sounds] : lungs were clear to auscultation bilaterally [Heart Rate And Rhythm] : heart rate was normal and rhythm regular [Heart Sounds] : normal S1 and S2 [Motor Tone] : muscle strength and tone were normal [] : no rash [Oriented To Time, Place, And Person] : oriented to person, place, and time [FreeTextEntry1] : Left ankle/foot with swelling, no tenderness

## 2021-07-27 NOTE — ASSESSMENT
[FreeTextEntry1] : 66 yo F with hx of RA (seropositive, non erosive), OA knees b/l, L Lewis Cyst, LTBI s/p rifampin x2.5mo in 2018, pulmonary nodules,DM2, here for follow up. \par \par #RA (seropositive, non-erosive). CDAI-RA low disease activity\par Will check laboratory tests to look for markers of disease activity and also to assess for medication toxicity.\par continue current medications. CDAI Score (Total): 10.0 \par - c/w Enbrel Qweekly - (medication available at pharmacy for pick-up) \par - obtain Xrays of hands, wrist, feet (2019 xrays without erosions)\par - obtain Dexa, (last Dexa in 8-2019 was normal)\par - check ESR, CRP, CBC, CMP, Vitamin D\par - obtain Chest Xray for hx of treated latent TB and on Enbrel\par - check hep B PCR, Hep B Core Antibody, Hep C (patient with previous Hep B infection based on serology and will need hep B PCR q3 months)\par \par #Vitamin D deficiency (resolved during last visit)\par -check Vitamin D level to ensure at appropriate level\par \par #DM2 \par -c/w meds as per pmd\par -glycemic optimization with PMD and or endo. \par \par # HM\par - 8/2018 HBV DNA (h/o + HbsAg) non detectable, HCV negative, 8/18. HbsAb+ 7/2019\par - Quant Gold negative on 4/19, s/p rifampin in fall 2018 after quant gold positivity \par - DEXA 7/2019 normal, no clinical indication for tx. Next in 2021.\par - PCV 13 7/2019, PPSV 11/4/2019\par - flu shot 11/2019\par \par f/u in 3 months\par \par d/w Dr. Higuera \par \par \par

## 2021-07-27 NOTE — HISTORY OF PRESENT ILLNESS
[FreeTextEntry1] : 66 yo F with hx of RA (dx 5/2014; seropositive, non erosive), OA knees b/l, L Lewis Cyst, LTBI s/p rifampin x2.5mo in 2018, pulmonary nodules, DM2 A1c, here for follow up.\par \par TODAY- \par Patient reports new left lower extremity swelling. This has been present for the past 4 weeks. The swelling is in  her ankle and foot. She also reports pain lateral to her left knee when walking. She denies any other complaints. She denies any joint pain, swelling. She has been off her Enbrel for the past 4 weeks. She denies fevers, chills, rashes, oral ulcers. She denies morning stiffness. Patient endorses weight loss attributing it to Trulicity. \par \par \par ===========\par Hx:\par Pt with RA (5/2014 +RF/CCP, non erosive) and mild OA of knees. Initially treated with MTX but stopped given urticarial rash. Leflunomide was started but pt had an intolerance (GI AE). Initiated on Humira with good response. Disease in remission. \par Antibody Profile: RF, CCP. Previous Non-Biologic DMARD: mtx (d/c due to urticarial rash), Arava (d/c with gi intolerance). \par GI cleared for Prior hep B infection - PCR 8/2018 - not detected\par 3/2017, pt s/p L knee arthrocentesis and injection with depomedrol with improvement in symptoms. \par 8/2018 - xray hand and foot: no erosions; calcaneal enthesopathy\par \par Around fall 2018, pt was found to have Quant Gold positivity. No pulm CT symptoms. CT chest negative for active dz. Pt took rifampin for 2.5/4 months, but stopped as pt could not tolerate. \par During time, pt had no RA activity, d/ashlee off humira after being on it since 2015. \par \par By 4/19, pt had L knee pain, was attributed to OA. Had L knee glucocorticoid injection. \par \par 6/26/2019 - started on Enbrel qwkly (as pt off humira for many months, and likely built ab, and now less effective). Was also started on PDN 15mg qd and received IM depomedrol 40mg at bedside.\par \par 10/2019: MRI ankles b/l reviewed - mild tenosynovitis with early TMT joint changes which may be OA related

## 2021-07-28 DIAGNOSIS — M19.90 UNSPECIFIED OSTEOARTHRITIS, UNSPECIFIED SITE: ICD-10-CM

## 2021-07-28 DIAGNOSIS — Z79.899 OTHER LONG TERM (CURRENT) DRUG THERAPY: ICD-10-CM

## 2021-07-28 DIAGNOSIS — R76.8 OTHER SPECIFIED ABNORMAL IMMUNOLOGICAL FINDINGS IN SERUM: ICD-10-CM

## 2021-07-28 DIAGNOSIS — M06.9 RHEUMATOID ARTHRITIS, UNSPECIFIED: ICD-10-CM

## 2021-10-08 ENCOUNTER — RX RENEWAL (OUTPATIENT)
Age: 66
End: 2021-10-08

## 2021-10-18 ENCOUNTER — APPOINTMENT (OUTPATIENT)
Dept: RHEUMATOLOGY | Facility: CLINIC | Age: 66
End: 2021-10-18
Payer: MEDICARE

## 2021-10-18 ENCOUNTER — OUTPATIENT (OUTPATIENT)
Dept: OUTPATIENT SERVICES | Facility: HOSPITAL | Age: 66
LOS: 1 days | End: 2021-10-18

## 2021-10-18 VITALS
HEIGHT: 68 IN | DIASTOLIC BLOOD PRESSURE: 60 MMHG | OXYGEN SATURATION: 96 % | BODY MASS INDEX: 28.68 KG/M2 | WEIGHT: 189.25 LBS | HEART RATE: 102 BPM | SYSTOLIC BLOOD PRESSURE: 130 MMHG

## 2021-10-18 DIAGNOSIS — Z98.890 OTHER SPECIFIED POSTPROCEDURAL STATES: Chronic | ICD-10-CM

## 2021-10-18 DIAGNOSIS — Z23 ENCOUNTER FOR IMMUNIZATION: ICD-10-CM

## 2021-10-18 PROCEDURE — 99213 OFFICE O/P EST LOW 20 MIN: CPT | Mod: GC

## 2021-10-19 NOTE — HISTORY OF PRESENT ILLNESS
[FreeTextEntry1] : Interval Hx 10-19-21:\rachel Presents for a regular office visit.\par Says she feels great, no joint pain, stiffness or swelling. \par No fever, chills, n/v/d, abdominal pain, cough, CP or SOB.\par

## 2021-10-19 NOTE — ASSESSMENT
[FreeTextEntry1] : 64 yo F with hx of RA (seropositive, non erosive), OA knees b/l, L Lewis Cyst, LTBI s/p rifampin x2.5mo in 2018, pulmonary nodules,DM2, here for follow up. \par \par Impression: \par # RA (seropositive, non-erosive). \par CDAI-RA 0 today. \par On weekly Enbrel. \par Still awaiting Xrays and DEXA ordered from last visit. \par \par # Hx of LTBI?\par S/p treatment with Rifampin for 2.5 months previously based on +ve Quant gold.\par But repeat Quant Gold -ve?\par Unclear she ever had LTBI dx. \par \par # Hx of pulmonary nodules. \par Stable upper lobe nodule, last CT in 2019. \par \par # HCM\par Has positive Hep B sAb, -ve sAg. \par DEXA 7/2019 normal, no clinical indication for tx. Next in 2021.\par PCV 13 7/2019, PPSV 11/4/2019\par \par Recs: \par Labs.\par Hep B PCR Q 3 month check.\par Chest CT for pulmonary nodule f/u. \par Continue weekly Enbrel. \par Routine Xrays and DEXA still pending. \par \par F/u in 3 months\par \par D/w Dr. Higuera \par \par \par

## 2021-10-19 NOTE — PHYSICAL EXAM
[General Appearance - Alert] : alert [General Appearance - Well Nourished] : well nourished [Sclera] : the sclera and conjunctiva were normal [Auscultation Breath Sounds / Voice Sounds] : lungs were clear to auscultation bilaterally [Heart Rate And Rhythm] : heart rate was normal and rhythm regular [Heart Sounds] : normal S1 and S2 [Motor Tone] : muscle strength and tone were normal [] : no rash [Oriented To Time, Place, And Person] : oriented to person, place, and time [FreeTextEntry1] : No signs of synovitis, limitation of ROM or deformity of any joint.

## 2021-10-19 NOTE — REVIEW OF SYSTEMS
[Fever] : no fever [Chills] : no chills [Eye Pain] : no eye pain [Nosebleeds] : no nosebleeds [Chest Pain] : no chest pain [Shortness Of Breath] : no shortness of breath [Cough] : no cough [Abdominal Pain] : no abdominal pain [Vomiting] : no vomiting [Diarrhea] : no diarrhea [Joint Pain] : no joint pain [Joint Swelling] : no joint swelling [Joint Stiffness] : no joint stiffness [Dizziness] : no dizziness

## 2021-10-27 DIAGNOSIS — Z23 ENCOUNTER FOR IMMUNIZATION: ICD-10-CM

## 2021-10-27 DIAGNOSIS — M06.9 RHEUMATOID ARTHRITIS, UNSPECIFIED: ICD-10-CM

## 2021-10-27 DIAGNOSIS — Z79.899 OTHER LONG TERM (CURRENT) DRUG THERAPY: ICD-10-CM

## 2021-12-17 ENCOUNTER — APPOINTMENT (OUTPATIENT)
Dept: INTERNAL MEDICINE | Facility: CLINIC | Age: 66
End: 2021-12-17

## 2022-01-14 ENCOUNTER — APPOINTMENT (OUTPATIENT)
Dept: RADIOLOGY | Facility: IMAGING CENTER | Age: 67
End: 2022-01-14
Payer: MEDICARE

## 2022-01-14 ENCOUNTER — APPOINTMENT (OUTPATIENT)
Dept: ULTRASOUND IMAGING | Facility: IMAGING CENTER | Age: 67
End: 2022-01-14
Payer: MEDICARE

## 2022-01-14 ENCOUNTER — OUTPATIENT (OUTPATIENT)
Dept: OUTPATIENT SERVICES | Facility: HOSPITAL | Age: 67
LOS: 1 days | End: 2022-01-14
Payer: COMMERCIAL

## 2022-01-14 ENCOUNTER — RESULT REVIEW (OUTPATIENT)
Age: 67
End: 2022-01-14

## 2022-01-14 DIAGNOSIS — Z00.8 ENCOUNTER FOR OTHER GENERAL EXAMINATION: ICD-10-CM

## 2022-01-14 DIAGNOSIS — Z98.890 OTHER SPECIFIED POSTPROCEDURAL STATES: Chronic | ICD-10-CM

## 2022-01-14 PROCEDURE — 77080 DXA BONE DENSITY AXIAL: CPT

## 2022-01-14 PROCEDURE — 77080 DXA BONE DENSITY AXIAL: CPT | Mod: 26

## 2022-01-14 PROCEDURE — 72170 X-RAY EXAM OF PELVIS: CPT | Mod: 26

## 2022-01-14 PROCEDURE — 72170 X-RAY EXAM OF PELVIS: CPT

## 2022-01-24 ENCOUNTER — APPOINTMENT (OUTPATIENT)
Dept: RHEUMATOLOGY | Facility: CLINIC | Age: 67
End: 2022-01-24

## 2022-03-07 ENCOUNTER — APPOINTMENT (OUTPATIENT)
Dept: RHEUMATOLOGY | Facility: CLINIC | Age: 67
End: 2022-03-07
Payer: MEDICARE

## 2022-03-07 ENCOUNTER — RESULT REVIEW (OUTPATIENT)
Age: 67
End: 2022-03-07

## 2022-03-07 ENCOUNTER — OUTPATIENT (OUTPATIENT)
Dept: OUTPATIENT SERVICES | Facility: HOSPITAL | Age: 67
LOS: 1 days | End: 2022-03-07

## 2022-03-07 VITALS
OXYGEN SATURATION: 95 % | SYSTOLIC BLOOD PRESSURE: 124 MMHG | HEIGHT: 68 IN | HEART RATE: 94 BPM | DIASTOLIC BLOOD PRESSURE: 70 MMHG | WEIGHT: 194 LBS | BODY MASS INDEX: 29.4 KG/M2

## 2022-03-07 VITALS — TEMPERATURE: 97.6 F

## 2022-03-07 DIAGNOSIS — Z98.890 OTHER SPECIFIED POSTPROCEDURAL STATES: Chronic | ICD-10-CM

## 2022-03-07 PROCEDURE — 99214 OFFICE O/P EST MOD 30 MIN: CPT | Mod: GC

## 2022-03-10 NOTE — HISTORY OF PRESENT ILLNESS
[___ Day(s) Ago] : [unfilled] day(s) ago [de-identified] : 10/18/21  [FreeTextEntry1] : feels great from RA persepective -   no joint pain, stiffness or swelling. \par current meds: enbrel weekly \par c/o L. pain in buttock radiating down to thigh x 1 month, pain worse with movement, but still able to ambulate without assistance \par described pain as dull, \par No fever, chills, n/v/d, abdominal pain, cough, CP or SOB.

## 2022-03-10 NOTE — PHYSICAL EXAM
[General Appearance - Alert] : alert [General Appearance - Well Nourished] : well nourished [Sclera] : the sclera and conjunctiva were normal [Auscultation Breath Sounds / Voice Sounds] : lungs were clear to auscultation bilaterally [Heart Rate And Rhythm] : heart rate was normal and rhythm regular [Heart Sounds] : normal S1 and S2 [Motor Tone] : muscle strength and tone were normal [] : no rash [Oriented To Time, Place, And Person] : oriented to person, place, and time [No Focal Deficits] : no focal deficits [FreeTextEntry1] : Systolic Murmur present

## 2022-03-10 NOTE — ASSESSMENT
[FreeTextEntry1] : 66 yo F with hx of RA (seropositive, non erosive), OA knees b/l, L Lewis Cyst, LTBI s/p rifampin x2.5mo in 2018, pulmonary nodules,DM2, here for follow up. \par \par Impression: \par # RA (seropositive, non-erosive). \par - CDAI-RA 0 today. \par - On weekly Enbrel. \par - labs today \par - due for X-rays (hand/wrist, foot/ankle)  \par \par # L. buttock pain with focal tenderness and radiation down to thigh \par -  lumbar disc herniation vs L. ischium bursitis  \par - Tylenol PRN, heat pad \par - PT  \par \par # Hx of LTBI?\par S/p treatment with Rifampin for 2.5 months previously based on +ve Quant gold.\par - repeat quant- gold negative \par - pt was told that she had a false positive result \par \par # Hx of pulmonary nodules. \par Stable upper lobe nodule, last CT in 2019. \par \par # HCM\par Has positive Hep B core Ab, total IgM negative - Hep B PCR q3 month \par DEXA wnl in 2022 -> repeat in 5 years \par PCV13 - 7/2019\par PPSV-  11/4/2019\par covid vaccine UTD \par \par \par f/u in 3 month\par D/w Dr. Armstrong \par \par \par

## 2022-03-11 DIAGNOSIS — Z79.899 OTHER LONG TERM (CURRENT) DRUG THERAPY: ICD-10-CM

## 2022-03-11 DIAGNOSIS — M06.9 RHEUMATOID ARTHRITIS, UNSPECIFIED: ICD-10-CM

## 2022-03-11 DIAGNOSIS — M54.9 DORSALGIA, UNSPECIFIED: ICD-10-CM

## 2022-05-09 ENCOUNTER — APPOINTMENT (OUTPATIENT)
Dept: CARDIOLOGY | Facility: CLINIC | Age: 67
End: 2022-05-09

## 2022-06-17 ENCOUNTER — APPOINTMENT (OUTPATIENT)
Dept: CARDIOLOGY | Facility: CLINIC | Age: 67
End: 2022-06-17
Payer: MEDICARE

## 2022-06-17 ENCOUNTER — NON-APPOINTMENT (OUTPATIENT)
Age: 67
End: 2022-06-17

## 2022-06-17 VITALS
DIASTOLIC BLOOD PRESSURE: 66 MMHG | HEIGHT: 68 IN | HEART RATE: 79 BPM | SYSTOLIC BLOOD PRESSURE: 122 MMHG | RESPIRATION RATE: 16 BRPM | BODY MASS INDEX: 30.16 KG/M2 | WEIGHT: 199 LBS | OXYGEN SATURATION: 98 %

## 2022-06-17 PROCEDURE — 99204 OFFICE O/P NEW MOD 45 MIN: CPT | Mod: 25

## 2022-06-17 PROCEDURE — 93000 ELECTROCARDIOGRAM COMPLETE: CPT

## 2022-06-17 RX ORDER — ZOSTER VACCINE RECOMBINANT, ADJUVANTED 50 MCG/0.5
50 KIT INTRAMUSCULAR
Qty: 1 | Refills: 0 | Status: DISCONTINUED | COMMUNITY
Start: 2019-08-12 | End: 2022-06-17

## 2022-06-17 RX ORDER — PNEUMOCOCCAL 13-VALENT CONJUGATE VACCINE 2.2; 2.2; 2.2; 2.2; 2.2; 4.4; 2.2; 2.2; 2.2; 2.2; 2.2; 2.2; 2.2 UG/.5ML; UG/.5ML; UG/.5ML; UG/.5ML; UG/.5ML; UG/.5ML; UG/.5ML; UG/.5ML; UG/.5ML; UG/.5ML; UG/.5ML; UG/.5ML; UG/.5ML
INJECTION, SUSPENSION INTRAMUSCULAR
Qty: 1 | Refills: 0 | Status: DISCONTINUED | COMMUNITY
Start: 2019-07-15 | End: 2022-06-17

## 2022-06-17 RX ORDER — METOPROLOL SUCCINATE 50 MG/1
50 TABLET, EXTENDED RELEASE ORAL
Qty: 60 | Refills: 0 | Status: DISCONTINUED | COMMUNITY
Start: 2018-08-15 | End: 2022-06-17

## 2022-06-17 RX ORDER — AMLODIPINE BESYLATE 5 MG/1
5 TABLET ORAL DAILY
Qty: 90 | Refills: 3 | Status: DISCONTINUED | COMMUNITY
End: 2022-06-17

## 2022-06-17 RX ORDER — CETIRIZINE HYDROCHLORIDE 10 MG/1
10 CAPSULE, LIQUID FILLED ORAL
Qty: 30 | Refills: 2 | Status: DISCONTINUED | COMMUNITY
Start: 2019-11-04 | End: 2022-06-17

## 2022-06-17 RX ORDER — DULAGLUTIDE 4.5 MG/.5ML
INJECTION, SOLUTION SUBCUTANEOUS
Refills: 0 | Status: ACTIVE | COMMUNITY

## 2022-06-17 RX ORDER — PREDNISONE 2.5 MG/1
2.5 TABLET ORAL
Qty: 150 | Refills: 0 | Status: DISCONTINUED | COMMUNITY
Start: 2019-08-12 | End: 2022-06-17

## 2022-06-17 RX ORDER — ERGOCALCIFEROL 1.25 MG/1
1.25 MG CAPSULE ORAL
Qty: 8 | Refills: 0 | Status: DISCONTINUED | COMMUNITY
Start: 2019-07-22 | End: 2022-06-17

## 2022-06-17 RX ORDER — PREDNISONE 5 MG/1
5 TABLET ORAL
Qty: 90 | Refills: 3 | Status: DISCONTINUED | COMMUNITY
Start: 2019-06-24 | End: 2022-06-17

## 2022-06-17 RX ORDER — AMLODIPINE BESYLATE 10 MG/1
10 TABLET ORAL
Refills: 0 | Status: ACTIVE | COMMUNITY

## 2022-07-25 ENCOUNTER — APPOINTMENT (OUTPATIENT)
Dept: CARDIOLOGY | Facility: CLINIC | Age: 67
End: 2022-07-25

## 2022-08-05 ENCOUNTER — APPOINTMENT (OUTPATIENT)
Dept: CARDIOLOGY | Facility: CLINIC | Age: 67
End: 2022-08-05

## 2022-08-12 ENCOUNTER — APPOINTMENT (OUTPATIENT)
Dept: CARDIOLOGY | Facility: CLINIC | Age: 67
End: 2022-08-12

## 2022-09-19 ENCOUNTER — OUTPATIENT (OUTPATIENT)
Dept: OUTPATIENT SERVICES | Facility: HOSPITAL | Age: 67
LOS: 1 days | End: 2022-09-19

## 2022-09-19 ENCOUNTER — APPOINTMENT (OUTPATIENT)
Dept: RHEUMATOLOGY | Facility: CLINIC | Age: 67
End: 2022-09-19

## 2022-09-19 VITALS
HEART RATE: 91 BPM | BODY MASS INDEX: 30.01 KG/M2 | SYSTOLIC BLOOD PRESSURE: 142 MMHG | OXYGEN SATURATION: 97 % | HEIGHT: 68 IN | DIASTOLIC BLOOD PRESSURE: 70 MMHG | WEIGHT: 198 LBS | TEMPERATURE: 97.6 F

## 2022-09-19 DIAGNOSIS — Z98.890 OTHER SPECIFIED POSTPROCEDURAL STATES: Chronic | ICD-10-CM

## 2022-09-19 DIAGNOSIS — E55.9 VITAMIN D DEFICIENCY, UNSPECIFIED: ICD-10-CM

## 2022-09-19 PROCEDURE — 99214 OFFICE O/P EST MOD 30 MIN: CPT | Mod: GC

## 2022-09-19 NOTE — PHYSICAL EXAM
[General Appearance - Alert] : alert [General Appearance - In No Acute Distress] : in no acute distress [General Appearance - Well Nourished] : well nourished [General Appearance - Well Developed] : well developed [PERRL With Normal Accommodation] : pupils were equal in size, round, and reactive to light [Examination Of The Oral Cavity] : the lips and gums were normal [Oropharynx] : the oropharynx was normal [Respiration, Rhythm And Depth] : normal respiratory rhythm and effort [Auscultation Breath Sounds / Voice Sounds] : lungs were clear to auscultation bilaterally [Heart Rate And Rhythm] : heart rate was normal and rhythm regular [Heart Sounds] : normal S1 and S2 [Edema] : there was no peripheral edema [Bowel Sounds] : normal bowel sounds [Abdomen Soft] : soft [Abdomen Tenderness] : non-tender [Cervical Lymph Nodes Enlarged Posterior Bilaterally] : posterior cervical [Cervical Lymph Nodes Enlarged Anterior Bilaterally] : anterior cervical [Supraclavicular Lymph Nodes Enlarged Bilaterally] : supraclavicular [] : no rash [FreeTextEntry1] : Left hand 1st MCP, right wrist, Left knee synovitis

## 2022-09-19 NOTE — ASSESSMENT
[FreeTextEntry1] : 68 yo F with hx of RA (seropositive, non erosive), OA knees b/l, L Lewis Cyst, LTBI s/p rifampin x2.5mo in 2018, pulmonary nodules,DM2, here for follow up. \par \par Impression: \par # RA (seropositive, non-erosive). Typically well-controlled on Enbrel but has been nonadherent while on vacation and has since flared for last 3 weeks. Restarted weekly Enbrel on Saturday since returning\par - labs today \par - Start PO prednisone taper for flare. PO prednisone 20mg/day x1 week then 15mg/day x1 week then 10mg/day x1 week then 5mg/day x1 week\par -continue Enbrel for now, if flare does not resolve despite Enbrel use then will need to treat as secondary failure\par - due for X-rays (hand/wrist, foot/ankle), reassess at next visit\par \par # Hx of LTBI?\par S/p treatment with Rifampin for 2.5 months previously based on +ve Quant gold.\par - repeat quant- gold negative 3/2022\par - pt was told that she had a false positive result \par \par # Hx of pulmonary nodules. \par Stable upper lobe nodule, last CT in 2019. \par \par # HCM\par Has positive Hep B core Ab, total IgM negative - Hep B PCR q3 month, recheck this visit\par DEXA wnl in 2022 -> repeat in 5 years \par PCV13 - 7/2019\par PPSV- 11/4/2019\par covid vaccine UTD \par \par \par f/u in 4 weeks\par D/w Dr. Higuera \par

## 2022-09-19 NOTE — HISTORY OF PRESENT ILLNESS
[FreeTextEntry1] : Patient reports L>R knee and R>L hand/wrist pain and swelling for 3 weeks, associated with all day stiffness. Was on vacation in [place] for 2-3 weeks, did not take Enbrel. In [place], went to the ED there for left knee swelling (first joint affected at that time) had x-ray which was normal of the left knee. Was told it was RA flare - was Rx'ed 500mg daily of Naproxen which did not help. After noticing left knee pain, started developing R knee, right wrist, and left 1st MCP pain x1 week. No sick contacts, recent infections. No trauma, falls. Patient returned to [place] on Friday, took Enbrel on Saturday. Before this, patient was well controlled on her Enbrel.\par \par Has chronic b/l eye redness, occasional dry eye. No changes in vision, eye pain, photosensitivity. Takes eye drops at home, which patient did not take yet today. Denies fevers, chills, night sweats, weight loss, alopecia, rashes, ulcers, cough, chest pain, SOB, abdominal pain, n/v/d/c, changes in urinary freq, dysuria, hematuria, foamy urine.

## 2022-09-19 NOTE — DATA REVIEWED
[FreeTextEntry1] : Laboratory and radiology studies that were personally reviewed at today's visit are summarized above and below

## 2022-09-22 ENCOUNTER — NON-APPOINTMENT (OUTPATIENT)
Age: 67
End: 2022-09-22

## 2022-09-22 LAB
ALBUMIN SERPL ELPH-MCNC: 4.5 G/DL
ALP BLD-CCNC: 103 U/L
ALT SERPL-CCNC: 14 U/L
ANION GAP SERPL CALC-SCNC: 12 MMOL/L
AST SERPL-CCNC: 14 U/L
BASOPHILS # BLD AUTO: 0.1 K/UL
BASOPHILS NFR BLD AUTO: 1.3 %
BILIRUB SERPL-MCNC: 0.3 MG/DL
BUN SERPL-MCNC: 14 MG/DL
CALCIUM SERPL-MCNC: 11.1 MG/DL
CHLORIDE SERPL-SCNC: 101 MMOL/L
CO2 SERPL-SCNC: 29 MMOL/L
CREAT SERPL-MCNC: 0.53 MG/DL
CRP SERPL-MCNC: 9 MG/L
EGFR: 101 ML/MIN/1.73M2
EOSINOPHIL # BLD AUTO: 0.11 K/UL
EOSINOPHIL NFR BLD AUTO: 1.5 %
ERYTHROCYTE [SEDIMENTATION RATE] IN BLOOD BY WESTERGREN METHOD: 120 MM/HR
GLUCOSE SERPL-MCNC: 136 MG/DL
HAV IGM SER QL: NONREACTIVE
HBV CORE IGG+IGM SER QL: REACTIVE
HBV CORE IGM SER QL: NONREACTIVE
HBV SURFACE AB SER QL: REACTIVE
HBV SURFACE AG SER QL: NONREACTIVE
HCT VFR BLD CALC: 38.1 %
HCV AB SER QL: NONREACTIVE
HCV S/CO RATIO: 0.15 S/CO
HEPB DNA PCR INT: NOT DETECTED
HEPB DNA PCR LOG: NOT DETECTED LOGIU/ML
HGB BLD-MCNC: 12.3 G/DL
IMM GRANULOCYTES NFR BLD AUTO: 0.3 %
LYMPHOCYTES # BLD AUTO: 3.82 K/UL
LYMPHOCYTES NFR BLD AUTO: 51.6 %
MAN DIFF?: NORMAL
MCHC RBC-ENTMCNC: 28.9 PG
MCHC RBC-ENTMCNC: 32.3 GM/DL
MCV RBC AUTO: 89.4 FL
MONOCYTES # BLD AUTO: 0.71 K/UL
MONOCYTES NFR BLD AUTO: 9.6 %
NEUTROPHILS # BLD AUTO: 2.65 K/UL
NEUTROPHILS NFR BLD AUTO: 35.7 %
PLATELET # BLD AUTO: 342 K/UL
POTASSIUM SERPL-SCNC: 4.9 MMOL/L
PROT SERPL-MCNC: 8.4 G/DL
RBC # BLD: 4.26 M/UL
RBC # FLD: 13 %
SODIUM SERPL-SCNC: 141 MMOL/L
WBC # FLD AUTO: 7.41 K/UL

## 2022-09-23 DIAGNOSIS — M19.90 UNSPECIFIED OSTEOARTHRITIS, UNSPECIFIED SITE: ICD-10-CM

## 2022-09-23 DIAGNOSIS — E55.9 VITAMIN D DEFICIENCY, UNSPECIFIED: ICD-10-CM

## 2022-09-23 DIAGNOSIS — M06.9 RHEUMATOID ARTHRITIS, UNSPECIFIED: ICD-10-CM

## 2022-10-17 ENCOUNTER — APPOINTMENT (OUTPATIENT)
Dept: CARDIOLOGY | Facility: CLINIC | Age: 67
End: 2022-10-17

## 2022-10-20 ENCOUNTER — APPOINTMENT (OUTPATIENT)
Dept: CARDIOLOGY | Facility: CLINIC | Age: 67
End: 2022-10-20

## 2022-10-20 PROCEDURE — 93306 TTE W/DOPPLER COMPLETE: CPT

## 2022-10-21 ENCOUNTER — APPOINTMENT (OUTPATIENT)
Dept: CARDIOLOGY | Facility: CLINIC | Age: 67
End: 2022-10-21

## 2022-10-21 PROCEDURE — 78452 HT MUSCLE IMAGE SPECT MULT: CPT

## 2022-10-21 PROCEDURE — 93015 CV STRESS TEST SUPVJ I&R: CPT

## 2022-10-21 PROCEDURE — A9500: CPT

## 2022-10-21 RX ORDER — REGADENOSON 0.08 MG/ML
0.4 INJECTION, SOLUTION INTRAVENOUS
Qty: 4 | Refills: 0 | Status: COMPLETED | OUTPATIENT
Start: 2022-10-21

## 2022-10-21 RX ADMIN — REGADENOSON 5 MG/5ML: 0.08 INJECTION, SOLUTION INTRAVENOUS at 00:00

## 2022-10-24 ENCOUNTER — APPOINTMENT (OUTPATIENT)
Dept: RHEUMATOLOGY | Facility: CLINIC | Age: 67
End: 2022-10-24

## 2022-10-24 ENCOUNTER — OUTPATIENT (OUTPATIENT)
Dept: OUTPATIENT SERVICES | Facility: HOSPITAL | Age: 67
LOS: 1 days | End: 2022-10-24

## 2022-10-24 ENCOUNTER — RESULT REVIEW (OUTPATIENT)
Age: 67
End: 2022-10-24

## 2022-10-24 VITALS
WEIGHT: 204 LBS | SYSTOLIC BLOOD PRESSURE: 152 MMHG | HEIGHT: 68 IN | HEART RATE: 102 BPM | DIASTOLIC BLOOD PRESSURE: 60 MMHG | OXYGEN SATURATION: 97 % | TEMPERATURE: 97.2 F | BODY MASS INDEX: 30.92 KG/M2

## 2022-10-24 DIAGNOSIS — Z98.890 OTHER SPECIFIED POSTPROCEDURAL STATES: Chronic | ICD-10-CM

## 2022-10-24 PROCEDURE — 99214 OFFICE O/P EST MOD 30 MIN: CPT | Mod: GC

## 2022-10-24 RX ORDER — VALACYCLOVIR 1 G/1
1 TABLET, FILM COATED ORAL 3 TIMES DAILY
Qty: 30 | Refills: 0 | Status: ACTIVE | COMMUNITY
Start: 2022-10-24 | End: 1900-01-01

## 2022-10-24 NOTE — ASSESSMENT
[FreeTextEntry1] : 68 yo F with hx of RA (seropositive, non erosive), OA knees b/l, L Lewis Cyst, LTBI s/p rifampin x2.5mo in 2018, pulmonary nodules,DM2, here for follow up. \par \par #rash 2/2 shingles: +erythematous rash on neck, and L sided of face in dermatomal pattern w/ very small vesicles present, most likely shingles infection\par -prescribed valtrex 1g TID for 7-10 days\par -RTC in 1 week to assess response\par \par # RA (seropositive, non-erosive). Typically well-controlled on Enbrel. Had recent flare 9/2022 2/2 nonadherence while on vacation. Received prednisone taper w/ improvement in symptoms. CDAI today = 10, low activity\par -c/w prednisone taper 5mg (1 week), then 2.5mg (1 week), then off prednisone\par -hold Enbrel for 1 week w/ active shingles infection, can resume following week\par -ordered X-rays (hand/wrist/knee)\par \par # Hx of LTBI?\par S/p treatment with Rifampin for 2.5 months previously based on +ve Quant gold.\par - repeat quant- gold negative 3/2022\par - pt was told that she had a false positive result \par \par # Hx of pulmonary nodules. \par Stable upper lobe nodule, last CT in 2019. \par \par # HCM\par Has positive Hep B core Ab, total IgM negative - Hep B PCR q3 month (9/2022 negative)\par DEXA wnl in 2022 -> repeat in 5 years \par PCV13 - 7/2019\par PPSV- 11/4/2019\par covid vaccine UTD \par \par RTC in 1 week to assess response to valtrex\par D/w Dr. Armstrong\par

## 2022-10-24 NOTE — HISTORY OF PRESENT ILLNESS
[___ Month(s) Ago] : [unfilled] month(s) ago [FreeTextEntry1] : 10/24/2022: Pt says she feels well. Her b/l wrist pain has resolved, denies am hand stiffness. Says she still has pain in L knee, and does not have significant improvement in pain since last visit. Has two days left of her prednisone taper, currently on 5mg qd. Also says she developed a rash on back of scalp as well as face couple of days ago. Describes rash as itchy, denies pain or bleeding from rash, denies hx of shingles or psoriasis. Tried topical steroid cream for rash but did not help. \par \par ROS: Deneis fever, chills, chest pain, sob, abdominal pain, constipation, diarrhea, nausea, vomiting

## 2022-10-24 NOTE — PHYSICAL EXAM
[General Appearance - Alert] : alert [General Appearance - In No Acute Distress] : in no acute distress [Sclera] : the sclera and conjunctiva were normal [General Appearance - Well Nourished] : well nourished [Outer Ear] : the ears and nose were normal in appearance [Neck Appearance] : the appearance of the neck was normal [] : no respiratory distress [Respiration, Rhythm And Depth] : normal respiratory rhythm and effort [Auscultation Breath Sounds / Voice Sounds] : lungs were clear to auscultation bilaterally [Exaggerated Use Of Accessory Muscles For Inspiration] : no accessory muscle use [Heart Rate And Rhythm] : heart rate was normal and rhythm regular [Heart Sounds] : normal S1 and S2 [Edema] : there was no peripheral edema [Abdomen Soft] : soft [Abdomen Tenderness] : non-tender [No Spinal Tenderness] : no spinal tenderness [No Focal Deficits] : no focal deficits [Impaired Insight] : insight and judgment were intact [FreeTextEntry1] : +erythematous rash on neck, and L sided of face in dermatomal pattern w/ very small vesicles present

## 2022-10-25 DIAGNOSIS — B02.9 ZOSTER WITHOUT COMPLICATIONS: ICD-10-CM

## 2022-10-25 DIAGNOSIS — M06.9 RHEUMATOID ARTHRITIS, UNSPECIFIED: ICD-10-CM

## 2022-10-25 DIAGNOSIS — M19.90 UNSPECIFIED OSTEOARTHRITIS, UNSPECIFIED SITE: ICD-10-CM

## 2022-10-25 DIAGNOSIS — Z79.899 OTHER LONG TERM (CURRENT) DRUG THERAPY: ICD-10-CM

## 2022-11-07 ENCOUNTER — APPOINTMENT (OUTPATIENT)
Dept: RHEUMATOLOGY | Facility: CLINIC | Age: 67
End: 2022-11-07

## 2022-11-07 ENCOUNTER — OUTPATIENT (OUTPATIENT)
Dept: OUTPATIENT SERVICES | Facility: HOSPITAL | Age: 67
LOS: 1 days | End: 2022-11-07

## 2022-11-07 VITALS
WEIGHT: 199 LBS | OXYGEN SATURATION: 97 % | BODY MASS INDEX: 30.16 KG/M2 | SYSTOLIC BLOOD PRESSURE: 140 MMHG | HEART RATE: 98 BPM | DIASTOLIC BLOOD PRESSURE: 80 MMHG | HEIGHT: 68 IN

## 2022-11-07 DIAGNOSIS — Z98.890 OTHER SPECIFIED POSTPROCEDURAL STATES: Chronic | ICD-10-CM

## 2022-11-07 PROCEDURE — 99214 OFFICE O/P EST MOD 30 MIN: CPT | Mod: GC

## 2022-11-07 NOTE — ASSESSMENT
[FreeTextEntry1] : 68 yo F with hx of RA (seropositive, non erosive), OA knees b/l, L Lewis Cyst, LTBI s/p rifampin x2.5mo in 2018, pulmonary nodules,DM2, here for follow up. \par \par #rash 2/2 shingles: Crusted over s/p Valtrex\par -recommended Zoster vaccination in few months\par \par # RA (seropositive, non-erosive). Recent flare in the context of medication nonadherence when traveling on vacation; no longer on prednisone and tolerating Enbrel, which was held x1 week since shingles infection. CDAI 0 today, left knee pain likely 2/2 OA, bedside US of left knee with small effusion and presence of osteophytes but no doppler signal\par -resume Enbrel SQ weekly\par -f/u X-rays (hand/wrist/knee)\par -check labs to monitor for disease activity and medication toxicity \par -PT for knee OA \par \par # Hx of LTBI?\par S/p treatment with Rifampin for 2.5 months previously based on +ve Quant gold.\par - repeat quant- gold negative 3/2022\par - pt was told that she had a false positive result \par \par # Hx of pulmonary nodules. \par Stable upper lobe nodule, last CT in 2019. \par \par # HCM\par Has positive Hep B core Ab, total IgM negative - Hep B PCR q3 month (9/2022 negative)\par DEXA wnl in 2022 -> repeat in 5 years \par PCV13 - 7/2019\par PPSV- 11/4/2019\par covid vaccine: s/p booster, recommended bivalent vaccine\par Flu- offered, unable to administer in office, recommended for patient to get with PCP/pharmacy\par Defer shingles vaccine given recent infection, will recommend in future\par Due for mammo and colon CA screening, recommended PCP f/u \par \par RTC 3 months or sooner PRN\par D/w Dr. Higuera

## 2022-11-07 NOTE — PHYSICAL EXAM
[General Appearance - Alert] : alert [General Appearance - In No Acute Distress] : in no acute distress [General Appearance - Well Nourished] : well nourished [General Appearance - Well Developed] : well developed [Sclera] : the sclera and conjunctiva were normal [PERRL With Normal Accommodation] : pupils were equal in size, round, and reactive to light [Examination Of The Oral Cavity] : the lips and gums were normal [Oropharynx] : the oropharynx was normal [] : no respiratory distress [Respiration, Rhythm And Depth] : normal respiratory rhythm and effort [Auscultation Breath Sounds / Voice Sounds] : lungs were clear to auscultation bilaterally [Heart Rate And Rhythm] : heart rate was normal and rhythm regular [Heart Sounds] : normal S1 and S2 [Edema] : there was no peripheral edema [Bowel Sounds] : normal bowel sounds [Abdomen Soft] : soft [Abdomen Tenderness] : non-tender [No CVA Tenderness] : no ~M costovertebral angle tenderness [No Spinal Tenderness] : no spinal tenderness [Abnormal Walk] : normal gait [Nail Clubbing] : no clubbing  or cyanosis of the fingernails [Musculoskeletal - Swelling] : no joint swelling seen [FreeTextEntry1] : Posterior neck with crusted lesions

## 2022-11-07 NOTE — HISTORY OF PRESENT ILLNESS
[FreeTextEntry1] : Patient finished Valtrex, rash improved now with crusting. No burning or pain, but has pruritus. \par \par Left knee pain, pain without stiffness or swelling. Worse with movement. \par Takes Enbrel but has been held since Zoster flare, last dose was 2 weeks ago. No other medications\par Denies fevers, chills, night sweats, alopecia, eye pain, eye redness, vision changes, photosensitivity, rashes, ulcers, cough, chest pain, SOB, abdominal pain, n/v/d/c, urinary changes, foamy urine, dysuria, hematuria.

## 2022-11-08 DIAGNOSIS — B02.9 ZOSTER WITHOUT COMPLICATIONS: ICD-10-CM

## 2022-11-08 DIAGNOSIS — M06.9 RHEUMATOID ARTHRITIS, UNSPECIFIED: ICD-10-CM

## 2022-11-08 DIAGNOSIS — M25.562 PAIN IN LEFT KNEE: ICD-10-CM

## 2022-11-15 LAB
ALBUMIN SERPL ELPH-MCNC: 4.5 G/DL
ALP BLD-CCNC: 92 U/L
ALT SERPL-CCNC: 13 U/L
ANION GAP SERPL CALC-SCNC: 13 MMOL/L
AST SERPL-CCNC: 19 U/L
BASOPHILS # BLD AUTO: 0.08 K/UL
BASOPHILS NFR BLD AUTO: 1 %
BILIRUB SERPL-MCNC: 0.2 MG/DL
BUN SERPL-MCNC: 14 MG/DL
CALCIUM SERPL-MCNC: 10.5 MG/DL
CHLORIDE SERPL-SCNC: 98 MMOL/L
CO2 SERPL-SCNC: 26 MMOL/L
CREAT SERPL-MCNC: 0.61 MG/DL
CRP SERPL-MCNC: <3 MG/L
EGFR: 97 ML/MIN/1.73M2
EOSINOPHIL # BLD AUTO: 0.07 K/UL
EOSINOPHIL NFR BLD AUTO: 0.9 %
ERYTHROCYTE [SEDIMENTATION RATE] IN BLOOD BY WESTERGREN METHOD: 43 MM/HR
GLUCOSE SERPL-MCNC: 122 MG/DL
HCT VFR BLD CALC: 42.1 %
HGB BLD-MCNC: 13.3 G/DL
IMM GRANULOCYTES NFR BLD AUTO: 0.5 %
LYMPHOCYTES # BLD AUTO: 3.76 K/UL
LYMPHOCYTES NFR BLD AUTO: 47.4 %
MAN DIFF?: NORMAL
MCHC RBC-ENTMCNC: 28.6 PG
MCHC RBC-ENTMCNC: 31.6 GM/DL
MCV RBC AUTO: 90.5 FL
MONOCYTES # BLD AUTO: 0.73 K/UL
MONOCYTES NFR BLD AUTO: 9.2 %
NEUTROPHILS # BLD AUTO: 3.26 K/UL
NEUTROPHILS NFR BLD AUTO: 41 %
PLATELET # BLD AUTO: 355 K/UL
POTASSIUM SERPL-SCNC: 4.2 MMOL/L
PROT SERPL-MCNC: 8.3 G/DL
RBC # BLD: 4.65 M/UL
RBC # FLD: 13.9 %
SODIUM SERPL-SCNC: 138 MMOL/L
WBC # FLD AUTO: 7.94 K/UL

## 2022-12-05 ENCOUNTER — APPOINTMENT (OUTPATIENT)
Dept: CARDIOLOGY | Facility: CLINIC | Age: 67
End: 2022-12-05
Payer: SELF-PAY

## 2022-12-05 ENCOUNTER — APPOINTMENT (OUTPATIENT)
Dept: CARDIOLOGY | Facility: CLINIC | Age: 67
End: 2022-12-05

## 2022-12-05 PROCEDURE — SNS01: CPT

## 2022-12-09 ENCOUNTER — NON-APPOINTMENT (OUTPATIENT)
Age: 67
End: 2022-12-09

## 2022-12-09 ENCOUNTER — APPOINTMENT (OUTPATIENT)
Dept: CARDIOLOGY | Facility: CLINIC | Age: 67
End: 2022-12-09

## 2022-12-09 VITALS
SYSTOLIC BLOOD PRESSURE: 156 MMHG | RESPIRATION RATE: 16 BRPM | HEART RATE: 88 BPM | DIASTOLIC BLOOD PRESSURE: 80 MMHG | BODY MASS INDEX: 31.07 KG/M2 | WEIGHT: 205 LBS | HEIGHT: 68 IN

## 2022-12-09 PROCEDURE — 93000 ELECTROCARDIOGRAM COMPLETE: CPT

## 2022-12-09 PROCEDURE — 99215 OFFICE O/P EST HI 40 MIN: CPT | Mod: 25

## 2022-12-09 NOTE — HISTORY OF PRESENT ILLNESS
[FreeTextEntry1] : Patient's diabetes has been managed by her PCP (Dr. Damon) whom has her taking Trulicity.  Her most recent labs appear to show elevated Glucose levels, but there are no HgA1C levels to review;\par \par She also admits to not keeping well hydrated from day to day;\par \par Patient suffers from diffuse Rheumatoid Arthritis and follows closely with a Specialist for  this;\par \par The daily reports for her [employer] Event monitor appear to reveal NSR with an average heart rate in the 80s (Max in the low 100s, Min in the 50s).  There were no episodes reported for atrial fibrillation, SVT, VT, pauses or heart blocks.  There were occasional PVCs and PACs with burdens close to <1%.  ((awaiting summary report once patient mails back device));\par \par Pharmacologic Nuclear Stress test (10/21/2022):  Patient developed SOB and dizziness during the stress exam which resolved with rest.  The resting blood pressure was (160/72)-- while on lower dose Amlodipine 5 mg.  Patient developed no dysrhythmias during exercise. There was 1.0 mm horizontal ST segment depressions in leads II, III, aVF, V4, V5, and V6.  Fortunately, the myocardial perfusion imaging was normal with no signs of ischemia.  There was a hyperdynamic LV function; LVEF of 77%; ie- negative study;\par \par Transthoracic Echocardiogram (10/20/2022):  Global LV systolic function was hyperdynamic with LVEF of 65 to 70%.  The left and right atria normal in size and structure.  There was mild TR and MR. THere was no evidence of pericardial effusion. Interatrial and interventricular septa intact, with no evidence of intracardiac shunting;\par \par

## 2022-12-09 NOTE — ASSESSMENT
[FreeTextEntry1] : EKG 12/9/2022:  The EKG illustrates sinus rhythm, rate of 88 bpm, nonspecific ST depressions- nondiagnostic (chronic);  Essentially unchanged.\par \par \par \par PLAN:\par \par 1).  Patient reassured her nuclear stress test was mostly unremarkable without any significant evidence for coronary ischemia or exercise induced arrhythmias.  There was elevation in her resting blood pressure found after decreasing her Amlodipine from 10 to 5 mg daily.\par \par Her echocardiogram was also unremarkable with normal overall cardiac function, only mild valvulopathy and no signs of cardiac remodelling.  \par \par Her blood pressure is now elevated today in-office after titrating down her Amlodipine from 10 to 5 mg.  Will empirically increase her Amlodipine back to 10 mg daily.  Continue all other cardiac meds including ASA 81 mg and Chlorthalidone 25 mg.\par \par Patient reassured her cardiac Event monitor ((with daily reports)) did not demonstrate evidence for any significant arrhythmias, pauses or AV blocks.  She did have occasional PVCs / PACs, but with a low burden.  There were also some patient events recorded which had shown NSR with normal heart rates ((full summary will be reviewed once patient mails back device)).\par \par 2).  Recommend she follow up with he PCP (Dr. Damon) and Rheumatologist (Dr. Dr. Andrade) for routine blood work and testing.  ?possible Endocrinologist referral for better diabetes management.\par \par Routine blood pressure checks as well. \par \par 3).  Diet and lifestyle modification discussed including low sodium, low fat and low carbohydrate weight reducing diet.  Patient is to implement aerobic exercise regimen few days per week.  Keep well hydrated with water daily.\par \par 4).  No additional cardiac testing indicated at this time. \par \par 5).  Recommend patient report any untoward symptoms. \par \par 6).  Follow up with Dr. Hinojosa in 6 months or PRN.

## 2022-12-09 NOTE — REVIEW OF SYSTEMS
[Dyspnea on exertion] : dyspnea during exertion [Chest Discomfort] : chest discomfort [Palpitations] : palpitations [Negative] : Heme/Lymph [de-identified] : lilghtheadedness

## 2022-12-09 NOTE — PHYSICAL EXAM
[Well Developed] : well developed [No Acute Distress] : no acute distress [Obese] : obese [Normal Conjunctiva] : normal conjunctiva [Normal Venous Pressure] : normal venous pressure [No Carotid Bruit] : no carotid bruit [Normal S1, S2] : normal S1, S2 [No Rub] : no rub [No Gallop] : no gallop [Murmur] : murmur [Clear Lung Fields] : clear lung fields [Good Air Entry] : good air entry [No Respiratory Distress] : no respiratory distress  [Soft] : abdomen soft [Non Tender] : non-tender [No Masses/organomegaly] : no masses/organomegaly [Normal Gait] : normal gait [No Edema] : no edema [No Cyanosis] : no cyanosis [No Clubbing] : no clubbing [No Rash] : no rash [No Skin Lesions] : no skin lesions [Moves all extremities] : moves all extremities [No Focal Deficits] : no focal deficits [Normal Speech] : normal speech [Alert and Oriented] : alert and oriented [Normal memory] : normal memory [de-identified] : Grade II/VI systolic murmur

## 2022-12-09 NOTE — REASON FOR VISIT
[FreeTextEntry1] : The patient is a very pleasant 67-year-old black [place] woman whom came to our office for cardiac consultation last June with known history for heart murmur and hypertension with type 2 diabetes in the past.  She had been complaining of getting some intermittent palpitations in the chest as well as noticing occasional atypical fleeting chest pain described as sharp and stabbing.  She also reported getting occasional feelings of shortness of breath when she tries to do anything meaningful and also getting lightheadedness/ dizziness from time to time;\par \par During that time her daughter felt that her mother was on "too much medication" which could have been causing her lightheadedness / dizziness.  Although her blood pressure that day was on low end of normal, we had decided to titrate down her Amlodipine 10 mg to taking 5 mg daily instead;\par \par She continues to experience these occasional vague symptoms from time to time and her blood pressures are now elevated in the 150s to 160s over 70s to 80s;\par \par Patient was advised to undergo a 1 week Event monitor, Nuclear Stress test, and a Transthoracic Echocardiogram and she is back today with her daughter to review those results.  Unfortunately, she never mailed back the Event monitor but we were able to obtain daily reports;\par \par She denies substernal exertional CP, orthopnea, PND, syncope, edema.

## 2022-12-15 ENCOUNTER — APPOINTMENT (OUTPATIENT)
Dept: RADIOLOGY | Facility: CLINIC | Age: 67
End: 2022-12-15

## 2022-12-15 ENCOUNTER — APPOINTMENT (OUTPATIENT)
Dept: ULTRASOUND IMAGING | Facility: CLINIC | Age: 67
End: 2022-12-15

## 2023-02-04 ENCOUNTER — APPOINTMENT (OUTPATIENT)
Dept: RADIOLOGY | Facility: IMAGING CENTER | Age: 68
End: 2023-02-04
Payer: MEDICARE

## 2023-02-04 ENCOUNTER — OUTPATIENT (OUTPATIENT)
Dept: OUTPATIENT SERVICES | Facility: HOSPITAL | Age: 68
LOS: 1 days | End: 2023-02-04
Payer: COMMERCIAL

## 2023-02-04 ENCOUNTER — APPOINTMENT (OUTPATIENT)
Dept: RADIOLOGY | Facility: IMAGING CENTER | Age: 68
End: 2023-02-04

## 2023-02-04 DIAGNOSIS — M19.90 UNSPECIFIED OSTEOARTHRITIS, UNSPECIFIED SITE: ICD-10-CM

## 2023-02-04 PROCEDURE — 73110 X-RAY EXAM OF WRIST: CPT | Mod: 26,50

## 2023-02-04 PROCEDURE — 73110 X-RAY EXAM OF WRIST: CPT

## 2023-02-04 PROCEDURE — 73562 X-RAY EXAM OF KNEE 3: CPT | Mod: 26,50

## 2023-02-04 PROCEDURE — 73130 X-RAY EXAM OF HAND: CPT | Mod: 26,50

## 2023-02-04 PROCEDURE — 73610 X-RAY EXAM OF ANKLE: CPT | Mod: 26,50

## 2023-02-04 PROCEDURE — 73130 X-RAY EXAM OF HAND: CPT

## 2023-02-04 PROCEDURE — 73562 X-RAY EXAM OF KNEE 3: CPT

## 2023-02-04 PROCEDURE — 73610 X-RAY EXAM OF ANKLE: CPT

## 2023-02-06 ENCOUNTER — OUTPATIENT (OUTPATIENT)
Dept: OUTPATIENT SERVICES | Facility: HOSPITAL | Age: 68
LOS: 1 days | End: 2023-02-06

## 2023-02-06 ENCOUNTER — APPOINTMENT (OUTPATIENT)
Dept: RHEUMATOLOGY | Facility: CLINIC | Age: 68
End: 2023-02-06
Payer: MEDICARE

## 2023-02-06 VITALS
DIASTOLIC BLOOD PRESSURE: 70 MMHG | WEIGHT: 200 LBS | HEIGHT: 68 IN | BODY MASS INDEX: 30.31 KG/M2 | HEART RATE: 90 BPM | SYSTOLIC BLOOD PRESSURE: 149 MMHG | OXYGEN SATURATION: 98 %

## 2023-02-06 DIAGNOSIS — Z00.00 ENCOUNTER FOR GENERAL ADULT MEDICAL EXAMINATION W/OUT ABNORMAL FINDINGS: ICD-10-CM

## 2023-02-06 DIAGNOSIS — B02.9 ZOSTER W/OUT COMPLICATIONS: ICD-10-CM

## 2023-02-06 PROCEDURE — 99214 OFFICE O/P EST MOD 30 MIN: CPT | Mod: GC

## 2023-02-06 NOTE — PROCEDURE
[Today's Date:] : Date: [unfilled] [Patient] : the patient [Guardian] : the guardian [Risks] : risks [Benefits] : benefits [Consent Obtained] : written consent was obtained prior to the procedure and is detailed in the patient's record [Therapeutic] : therapeutic [#1 Site: ______] : #1 site identified in the [unfilled] [Ethyl Chloride] : ethyl chloride [___ ml Inj] : [unfilled] ~Uml [1%] : 1%  [Betadine] : betadine solution [Chlorhexidine] : chlorhexidine [22 gauge 1.5 inch] : A 22 gauge 1.5 inch needle was used [Depomedrol ___ mg] : Depomedrol [unfilled] mg [Tolerated Well] : the patient tolerated the procedure well [No Complications] : there were no complications [Instructions Given] : handouts/patient instructions were given to patient [Patient Instructed to Call] : patient was instructed to call if redness at site, a decrease in range of motion or an increase in pain is noted after procedure.

## 2023-02-07 ENCOUNTER — NON-APPOINTMENT (OUTPATIENT)
Age: 68
End: 2023-02-07

## 2023-02-07 DIAGNOSIS — R76.8 OTHER SPECIFIED ABNORMAL IMMUNOLOGICAL FINDINGS IN SERUM: ICD-10-CM

## 2023-02-07 DIAGNOSIS — Z00.00 ENCOUNTER FOR GENERAL ADULT MEDICAL EXAMINATION WITHOUT ABNORMAL FINDINGS: ICD-10-CM

## 2023-02-07 DIAGNOSIS — M06.9 RHEUMATOID ARTHRITIS, UNSPECIFIED: ICD-10-CM

## 2023-02-07 DIAGNOSIS — B02.9 ZOSTER WITHOUT COMPLICATIONS: ICD-10-CM

## 2023-02-07 DIAGNOSIS — M19.90 UNSPECIFIED OSTEOARTHRITIS, UNSPECIFIED SITE: ICD-10-CM

## 2023-02-07 LAB
ALBUMIN SERPL ELPH-MCNC: 4.5 G/DL
ALP BLD-CCNC: 89 U/L
ALT SERPL-CCNC: 16 U/L
ANION GAP SERPL CALC-SCNC: 15 MMOL/L
AST SERPL-CCNC: 14 U/L
BASOPHILS # BLD AUTO: 0.07 K/UL
BASOPHILS NFR BLD AUTO: 0.9 %
BILIRUB SERPL-MCNC: 0.3 MG/DL
BUN SERPL-MCNC: 14 MG/DL
CALCIUM SERPL-MCNC: 10.7 MG/DL
CHLORIDE SERPL-SCNC: 100 MMOL/L
CO2 SERPL-SCNC: 26 MMOL/L
CREAT SERPL-MCNC: 0.65 MG/DL
CRP SERPL-MCNC: <3 MG/L
EGFR: 96 ML/MIN/1.73M2
EOSINOPHIL # BLD AUTO: 0.16 K/UL
EOSINOPHIL NFR BLD AUTO: 2 %
ERYTHROCYTE [SEDIMENTATION RATE] IN BLOOD BY WESTERGREN METHOD: 107 MM/HR
GLUCOSE SERPL-MCNC: 139 MG/DL
HCT VFR BLD CALC: 40.8 %
HGB BLD-MCNC: 13.1 G/DL
IMM GRANULOCYTES NFR BLD AUTO: 0.2 %
LYMPHOCYTES # BLD AUTO: 4.6 K/UL
LYMPHOCYTES NFR BLD AUTO: 56.4 %
MAN DIFF?: NORMAL
MCHC RBC-ENTMCNC: 28.6 PG
MCHC RBC-ENTMCNC: 32.1 GM/DL
MCV RBC AUTO: 89.1 FL
MONOCYTES # BLD AUTO: 0.75 K/UL
MONOCYTES NFR BLD AUTO: 9.2 %
NEUTROPHILS # BLD AUTO: 2.56 K/UL
NEUTROPHILS NFR BLD AUTO: 31.3 %
PLATELET # BLD AUTO: 322 K/UL
POTASSIUM SERPL-SCNC: 3.6 MMOL/L
PROT SERPL-MCNC: 8.1 G/DL
RBC # BLD: 4.58 M/UL
RBC # FLD: 13.2 %
SODIUM SERPL-SCNC: 142 MMOL/L
WBC # FLD AUTO: 8.16 K/UL

## 2023-02-08 ENCOUNTER — NON-APPOINTMENT (OUTPATIENT)
Age: 68
End: 2023-02-08

## 2023-02-08 LAB
HEPB DNA PCR INT: NOT DETECTED
HEPB DNA PCR LOG: NOT DETECTED LOGIU/ML

## 2023-02-09 ENCOUNTER — NON-APPOINTMENT (OUTPATIENT)
Age: 68
End: 2023-02-09

## 2023-02-15 RX ORDER — KIT FOR THE PREPARATION OF TECHNETIUM TC99M SESTAMIBI 1 MG/5ML
INJECTION, POWDER, LYOPHILIZED, FOR SOLUTION PARENTERAL
Refills: 0 | Status: COMPLETED | OUTPATIENT
Start: 2023-02-15

## 2023-02-15 RX ADMIN — KIT FOR THE PREPARATION OF TECHNETIUM TC99M SESTAMIBI 0: 1 INJECTION, POWDER, LYOPHILIZED, FOR SOLUTION PARENTERAL at 00:00

## 2023-03-08 NOTE — H&P ADULT - PROBLEM SELECTOR PROBLEM 1
ACS (acute coronary syndrome) V-Y Plasty Text: The defect edges were debeveled with a #15 scalpel blade.  Given the location of the defect, shape of the defect and the proximity to free margins an V-Y advancement flap was deemed most appropriate.  Using a sterile surgical marker, an appropriate advancement flap was drawn incorporating the defect and placing the expected incisions within the relaxed skin tension lines where possible.    The area thus outlined was incised deep to adipose tissue with a #15 scalpel blade.  The skin margins were undermined to an appropriate distance in all directions utilizing iris scissors.

## 2023-05-15 ENCOUNTER — APPOINTMENT (OUTPATIENT)
Dept: RHEUMATOLOGY | Facility: CLINIC | Age: 68
End: 2023-05-15
Payer: MEDICARE

## 2023-05-15 ENCOUNTER — OUTPATIENT (OUTPATIENT)
Dept: OUTPATIENT SERVICES | Facility: HOSPITAL | Age: 68
LOS: 1 days | End: 2023-05-15

## 2023-05-15 VITALS
DIASTOLIC BLOOD PRESSURE: 68 MMHG | BODY MASS INDEX: 31.83 KG/M2 | HEART RATE: 95 BPM | RESPIRATION RATE: 18 BRPM | HEIGHT: 68 IN | TEMPERATURE: 97.6 F | OXYGEN SATURATION: 97 % | SYSTOLIC BLOOD PRESSURE: 154 MMHG | WEIGHT: 210 LBS

## 2023-05-15 DIAGNOSIS — Z98.890 OTHER SPECIFIED POSTPROCEDURAL STATES: Chronic | ICD-10-CM

## 2023-05-15 DIAGNOSIS — M19.90 UNSPECIFIED OSTEOARTHRITIS, UNSPECIFIED SITE: ICD-10-CM

## 2023-05-15 PROCEDURE — 99214 OFFICE O/P EST MOD 30 MIN: CPT | Mod: 25,GC

## 2023-05-15 RX ORDER — TRIAMCINOLONE ACETONIDE 40 MG/ML
40 SUSPENSION INTRA-ARTERIAL; INTRAMUSCULAR
Qty: 1 | Refills: 0 | Status: COMPLETED | OUTPATIENT
Start: 2023-05-15

## 2023-05-15 RX ORDER — PREDNISONE 2.5 MG/1
2.5 TABLET ORAL
Qty: 21 | Refills: 0 | Status: DISCONTINUED | COMMUNITY
Start: 2022-09-19 | End: 2023-05-15

## 2023-05-15 RX ORDER — ETANERCEPT 50 MG/ML
50 SOLUTION SUBCUTANEOUS
Qty: 12 | Refills: 3 | Status: DISCONTINUED | COMMUNITY
Start: 2019-06-24 | End: 2023-05-15

## 2023-05-15 RX ADMIN — TRIAMCINOLONE ACETONIDE 0 MG/ML: 40 INJECTION, SUSPENSION INTRA-ARTICULAR; INTRAMUSCULAR at 00:00

## 2023-05-15 NOTE — ASSESSMENT
[FreeTextEntry1] : 66 yo F with hx of RA (seropositive, non erosive), OA knees b/l, L Lewis Cyst, LTBI s/p rifampin x2.5mo in 2018, pulmonary nodules,DM2, here for follow up. \par \par # RA (seropositive, non-erosive)\par - CDAI 4 today, left knee pain likely 2/2 OA\par -continue Enbrel SQ weekly (6/2019- )  \par - not on Arava (d/c due to GI intolerance), not on MTX (d/c due to urticarial rash)  \par - Xray hands and wrists 02/23 with no erosions but with concern for osteonecrosis in left lunate\par - obtain MRI of L hand/wrist due to above concern for finding of possible osteonecrosis \par -check labs to monitor for disease activity and medication toxicity \par \par #  b/l knee OA (L> R)  \par - responded well to steroid injection in the past, most recent injection > 1 year ago \par - s/p L. knee Depomedrol 40mg injection in office 2/6/23, pt tolerated well \par - s/p L. knee triamcinolone acetonide 40mg injection in office 5/15/23, pt tolerated well \par - PT referral provided this visit\par \par # Hx of LTBI?\par S/p treatment with Rifampin for 2.5 months previously based on +ve Quant gold.\par - repeat quant- gold negative 3/2022\par - check Quant TB next visit \par \par # Hx of pulmonary nodules. \par - Stable upper lobe nodule, last CT in 2019. 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 Higuera\par

## 2023-05-15 NOTE — HISTORY OF PRESENT ILLNESS
[___ Day(s) Ago] : [unfilled] day(s) ago [de-identified] : 11/2022  [FreeTextEntry1] : Patient finished Valtrex in Nov,  lesions at the neck completely healed \par Enbrel resumed since Nov,  no morning stiffness, the only joint bothering her is L. knee \par has OA of L. knee: worse with movement, unable to participate in PT due to pain \par last injection was > 1 year ago \par \par Denies fevers, chills, night sweats, alopecia, eye pain, eye redness, vision changes, photosensitivity, rashes, ulcers, cough, chest pain, SOB, abdominal pain, n/v/d/c, urinary changes, foamy urine, dysuria, hematuria.

## 2023-05-15 NOTE — PHYSICAL EXAM
[General Appearance - Alert] : alert [General Appearance - In No Acute Distress] : in no acute distress [General Appearance - Well Nourished] : well nourished [General Appearance - Well Developed] : well developed [Sclera] : the sclera and conjunctiva were normal [PERRL With Normal Accommodation] : pupils were equal in size, round, and reactive to light [Examination Of The Oral Cavity] : the lips and gums were normal [Oropharynx] : the oropharynx was normal [Respiration, Rhythm And Depth] : normal respiratory rhythm and effort [Auscultation Breath Sounds / Voice Sounds] : lungs were clear to auscultation bilaterally [Heart Rate And Rhythm] : heart rate was normal and rhythm regular [Heart Sounds] : normal S1 and S2 [Edema] : there was no peripheral edema [Bowel Sounds] : normal bowel sounds [Abdomen Soft] : soft [Abdomen Tenderness] : non-tender [No CVA Tenderness] : no ~M costovertebral angle tenderness [No Spinal Tenderness] : no spinal tenderness [Abnormal Walk] : normal gait [Nail Clubbing] : no clubbing  or cyanosis of the fingernails [Musculoskeletal - Swelling] : no joint swelling seen [] : no rash [No Focal Deficits] : no focal deficits [Oriented To Time, Place, And Person] : oriented to person, place, and time [FreeTextEntry1] : no synovitis; left knee with small effusion, cool, mild tenderness to touch with crepitus

## 2023-05-15 NOTE — PROCEDURE
[Today's Date:] : Date: [unfilled] [Risks] : risks [Benefits] : benefits [Consent Obtained] : written consent was obtained prior to the procedure and is detailed in the patient's record [Patient] : Prior to the start of the procedure a time out was taken and the identity of the patient was confirmed via name and date of birth with the patient. The correct site and the procedure to be performed were confirmed. The correct side was confirmed if applicable. The availability of the correct equipment was verified [Therapeutic] : therapeutic [#1 Site: ______] : #1 site identified in the [unfilled] [Betadine] : betadine solution [Alcohol] : alcohol [22 gauge 1.5 inch] : A 22 gauge 1.5 inch needle was used [Tolerated Well] : the patient tolerated the procedure well [No Complications] : there were no complications [Instructions Given] : handouts/patient instructions were given to patient [de-identified] : Triamcinolone acetonide 40 mg [FreeTextEntry1] : L Knee Osteoarthritis - 40 mg triamcinolone acetonide injection

## 2023-05-15 NOTE — ASSESSMENT
[FreeTextEntry1] : 66 yo F with hx of RA (seropositive, non erosive), OA knees b/l, L Lewis Cyst, LTBI s/p rifampin x2.5mo in 2018, pulmonary nodules,DM2, here for follow up. \par \par # RA (seropositive, non-erosive)\par - CDAI 0 today, left knee pain likely 2/2 OA, bedside US of left knee with small effusion and presence of osteophytes but no doppler signal\par -continue Enbrel SQ weekly (6/2019- )  \par - not on Arava (d/c due to GI intolerance), not on MTX (d/c due to urticarial rash)  \par - Xray 02/2022 - no erosions in hands and wrists,  b/l knee OA (L> R)  \par -check labs to monitor for disease activity and medication toxicity \par \par #  b/l knee OA (L> R)  \par - responded well to steroid injection in the past, most recent injection > 1 year ago \par - s/p L. knee Depomedrol 40mg injection in office 2/6/23, pt tolerated well \par - PT when pain improves\par - BMI  30, advice weight loss  \par \par # Hx of LTBI?\par S/p treatment with Rifampin for 2.5 months previously based on +ve Quant gold.\par - repeat quant- gold negative 3/2022\par \par # Hx of pulmonary nodules. \par - Stable upper lobe nodule, last CT in 2019. 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

## 2023-05-15 NOTE — REASON FOR VISIT
[Follow-Up: _____] : a [unfilled] follow-up visit [Family Member] : family member [FreeTextEntry1] : Rheumatoid Arthritis

## 2023-05-15 NOTE — HISTORY OF PRESENT ILLNESS
[___ Day(s) Ago] : [unfilled] day(s) ago [de-identified] : 2/6/23 [FreeTextEntry1] : Feels well. Denies morning stiffness, joint swelling. Joint pain only in left knee (OA). Compliant with Enbrel. Received intraarticular steroid injection of L knee during February visit. It gave her relief for 3 months\par \par Denies fevers, chills, night sweats, alopecia, eye pain, eye redness, vision changes, photosensitivity, rashes, ulcers, cough, chest pain, SOB, abdominal pain, n/v/d/c, urinary changes, foamy urine, dysuria, hematuria.

## 2023-05-16 DIAGNOSIS — M06.9 RHEUMATOID ARTHRITIS, UNSPECIFIED: ICD-10-CM

## 2023-05-16 DIAGNOSIS — M19.90 UNSPECIFIED OSTEOARTHRITIS, UNSPECIFIED SITE: ICD-10-CM

## 2023-05-16 DIAGNOSIS — M25.562 PAIN IN LEFT KNEE: ICD-10-CM

## 2023-05-19 ENCOUNTER — APPOINTMENT (OUTPATIENT)
Dept: CARDIOLOGY | Facility: CLINIC | Age: 68
End: 2023-05-19

## 2023-06-01 NOTE — ED PROVIDER NOTE - NS ED ATTENDING STATEMENT MOD
I have personally performed a face to face diagnostic evaluation on this patient. I have reviewed the ACP note and agree with the history, exam and plan of care, except as noted. Hydroxyzine Counseling: Patient advised that the medication is sedating and not to drive a car after taking this medication.  Patient informed of potential adverse effects including but not limited to dry mouth, urinary retention, and blurry vision.  The patient verbalized understanding of the proper use and possible adverse effects of hydroxyzine.  All of the patient's questions and concerns were addressed.

## 2023-06-02 LAB
ALBUMIN SERPL ELPH-MCNC: 4.6 G/DL
ALP BLD-CCNC: 88 U/L
ALT SERPL-CCNC: 17 U/L
ANION GAP SERPL CALC-SCNC: 15 MMOL/L
AST SERPL-CCNC: 17 U/L
BASOPHILS # BLD AUTO: 0.1 K/UL
BASOPHILS NFR BLD AUTO: 1.2 %
BILIRUB SERPL-MCNC: 0.2 MG/DL
BUN SERPL-MCNC: 15 MG/DL
CALCIUM SERPL-MCNC: 10.7 MG/DL
CHLORIDE SERPL-SCNC: 100 MMOL/L
CO2 SERPL-SCNC: 26 MMOL/L
CREAT SERPL-MCNC: 0.71 MG/DL
CRP SERPL-MCNC: <3 MG/L
EGFR: 93 ML/MIN/1.73M2
EOSINOPHIL # BLD AUTO: 0.15 K/UL
EOSINOPHIL NFR BLD AUTO: 1.8 %
ERYTHROCYTE [SEDIMENTATION RATE] IN BLOOD BY WESTERGREN METHOD: 77 MM/HR
GLUCOSE SERPL-MCNC: 114 MG/DL
HCT VFR BLD CALC: 43.1 %
HEPB DNA PCR INT: NOT DETECTED
HEPB DNA PCR LOG: NOT DETECTED LOGIU/ML
HGB BLD-MCNC: 13.4 G/DL
IMM GRANULOCYTES NFR BLD AUTO: 0.2 %
LYMPHOCYTES # BLD AUTO: 4.65 K/UL
LYMPHOCYTES NFR BLD AUTO: 56.9 %
MAN DIFF?: NORMAL
MCHC RBC-ENTMCNC: 28.8 PG
MCHC RBC-ENTMCNC: 31.1 GM/DL
MCV RBC AUTO: 92.7 FL
MONOCYTES # BLD AUTO: 0.93 K/UL
MONOCYTES NFR BLD AUTO: 11.4 %
NEUTROPHILS # BLD AUTO: 2.32 K/UL
NEUTROPHILS NFR BLD AUTO: 28.5 %
PLATELET # BLD AUTO: 301 K/UL
POTASSIUM SERPL-SCNC: 3.5 MMOL/L
PROT SERPL-MCNC: 8.5 G/DL
RBC # BLD: 4.65 M/UL
RBC # FLD: 13 %
SODIUM SERPL-SCNC: 141 MMOL/L
WBC # FLD AUTO: 8.17 K/UL

## 2023-08-08 ENCOUNTER — OUTPATIENT (OUTPATIENT)
Dept: OUTPATIENT SERVICES | Facility: HOSPITAL | Age: 68
LOS: 1 days | End: 2023-08-08
Payer: COMMERCIAL

## 2023-08-08 ENCOUNTER — APPOINTMENT (OUTPATIENT)
Dept: MRI IMAGING | Facility: IMAGING CENTER | Age: 68
End: 2023-08-08
Payer: MEDICARE

## 2023-08-08 DIAGNOSIS — Z98.890 OTHER SPECIFIED POSTPROCEDURAL STATES: Chronic | ICD-10-CM

## 2023-08-08 DIAGNOSIS — M06.9 RHEUMATOID ARTHRITIS, UNSPECIFIED: ICD-10-CM

## 2023-08-08 PROCEDURE — 73221 MRI JOINT UPR EXTREM W/O DYE: CPT

## 2023-08-08 PROCEDURE — 73221 MRI JOINT UPR EXTREM W/O DYE: CPT | Mod: 26,LT

## 2023-08-08 PROCEDURE — 73218 MRI UPPER EXTREMITY W/O DYE: CPT

## 2023-08-08 PROCEDURE — 73218 MRI UPPER EXTREMITY W/O DYE: CPT | Mod: 26,LT

## 2023-08-11 ENCOUNTER — APPOINTMENT (OUTPATIENT)
Dept: CARE COORDINATION | Facility: HOME HEALTH | Age: 68
End: 2023-08-11
Payer: MEDICARE

## 2023-08-11 VITALS — HEIGHT: 66 IN | WEIGHT: 185 LBS | BODY MASS INDEX: 29.73 KG/M2

## 2023-08-11 PROCEDURE — 99349 HOME/RES VST EST MOD MDM 40: CPT | Mod: 95

## 2023-08-11 NOTE — HISTORY OF PRESENT ILLNESS
[Family Member] : family member [FreeTextEntry1] : [facility] video visit [de-identified] : 69 y/o/f with pmhx of HTN, RA, DM presents with dtr for video visit HTN: stable, denies any complaints, compliant with regimen DM: stable, FS average 120-130s, compliant with medications/diet RA: stable, compliant with regimen, seen rheum 5/2023 follow up appt scheduled for 9/18/23  Vaccines: Flu/pna/shingles UTD mammo 2021 normal BD 2023 normal gyn: denies optho 2023 normal dentist: denies cologuard 2023 normal

## 2023-08-11 NOTE — PHYSICAL EXAM
[No Acute Distress] : no acute distress [No Respiratory Distress] : no respiratory distress  [No Accessory Muscle Use] : no accessory muscle use [Speech Grossly Normal] : speech grossly normal [Memory Grossly Normal] : memory grossly normal [Normal Affect] : the affect was normal [Normal Mood] : the mood was normal

## 2023-08-11 NOTE — REVIEW OF SYSTEMS
[Negative] : Psychiatric [FreeTextEntry5] : see hpi [FreeTextEntry7] : +constipation [de-identified] : see hpi

## 2023-08-11 NOTE — HEALTH RISK ASSESSMENT
[No] : No [No falls in past year] : Patient reported no falls in the past year [0] : 2) Feeling down, depressed, or hopeless: Not at all (0) [PHQ-2 Negative - No further assessment needed] : PHQ-2 Negative - No further assessment needed [de-identified] : stretches [de-identified] : low carb, salt, sugar [Never] : Never

## 2023-08-17 NOTE — ED SUB INTERN NOTE - NS MSEMN TIMING
sudden onset/waxing and waning Bactrim Pregnancy And Lactation Text: This medication is Pregnancy Category D and is known to cause fetal risk.  It is also excreted in breast milk.

## 2023-09-18 ENCOUNTER — LABORATORY RESULT (OUTPATIENT)
Age: 68
End: 2023-09-18

## 2023-09-18 ENCOUNTER — OUTPATIENT (OUTPATIENT)
Dept: OUTPATIENT SERVICES | Facility: HOSPITAL | Age: 68
LOS: 1 days | End: 2023-09-18

## 2023-09-18 ENCOUNTER — APPOINTMENT (OUTPATIENT)
Dept: RHEUMATOLOGY | Facility: CLINIC | Age: 68
End: 2023-09-18
Payer: MEDICARE

## 2023-09-18 VITALS
OXYGEN SATURATION: 96 % | TEMPERATURE: 97.3 F | BODY MASS INDEX: 32.47 KG/M2 | RESPIRATION RATE: 16 BRPM | HEART RATE: 86 BPM | DIASTOLIC BLOOD PRESSURE: 64 MMHG | WEIGHT: 202 LBS | HEIGHT: 66 IN | SYSTOLIC BLOOD PRESSURE: 154 MMHG

## 2023-09-18 DIAGNOSIS — H57.89 OTHER SPECIFIED DISORDERS OF EYE AND ADNEXA: ICD-10-CM

## 2023-09-18 DIAGNOSIS — M93.1 KIENBOCK'S DISEASE OF ADULTS: ICD-10-CM

## 2023-09-18 DIAGNOSIS — Z98.890 OTHER SPECIFIED POSTPROCEDURAL STATES: Chronic | ICD-10-CM

## 2023-09-18 PROCEDURE — 99214 OFFICE O/P EST MOD 30 MIN: CPT | Mod: GC

## 2023-09-19 LAB
ALBUMIN SERPL ELPH-MCNC: 4.6 G/DL
ALP BLD-CCNC: 92 U/L
ALT SERPL-CCNC: 14 U/L
ANION GAP SERPL CALC-SCNC: 15 MMOL/L
AST SERPL-CCNC: 14 U/L
BASOPHILS # BLD AUTO: 0.08 K/UL
BASOPHILS NFR BLD AUTO: 1.2 %
BILIRUB SERPL-MCNC: 0.4 MG/DL
BUN SERPL-MCNC: 10 MG/DL
CALCIUM SERPL-MCNC: 11.2 MG/DL
CHLORIDE SERPL-SCNC: 102 MMOL/L
CHOLEST SERPL-MCNC: 244 MG/DL
CO2 SERPL-SCNC: 25 MMOL/L
CREAT SERPL-MCNC: 0.7 MG/DL
CRP SERPL-MCNC: <3 MG/L
EGFR: 94 ML/MIN/1.73M2
EOSINOPHIL # BLD AUTO: 0.1 K/UL
EOSINOPHIL NFR BLD AUTO: 1.5 %
GLUCOSE SERPL-MCNC: 113 MG/DL
HAV IGM SER QL: NONREACTIVE
HBV CORE IGM SER QL: NONREACTIVE
HBV SURFACE AG SER QL: NONREACTIVE
HCT VFR BLD CALC: 42.9 %
HCV AB SER QL: NONREACTIVE
HCV S/CO RATIO: 0.12 S/CO
HDLC SERPL-MCNC: 100 MG/DL
HEPB DNA PCR INT: NOT DETECTED
HEPB DNA PCR LOG: NOT DETECTED LOGIU/ML
HGB BLD-MCNC: 13.9 G/DL
IMM GRANULOCYTES NFR BLD AUTO: 0.3 %
LDLC SERPL CALC-MCNC: 122 MG/DL
LYMPHOCYTES # BLD AUTO: 3.42 K/UL
LYMPHOCYTES NFR BLD AUTO: 51.5 %
MAN DIFF?: NORMAL
MCHC RBC-ENTMCNC: 28.4 PG
MCHC RBC-ENTMCNC: 32.4 GM/DL
MCV RBC AUTO: 87.7 FL
MONOCYTES # BLD AUTO: 0.61 K/UL
MONOCYTES NFR BLD AUTO: 9.2 %
NEUTROPHILS # BLD AUTO: 2.41 K/UL
NEUTROPHILS NFR BLD AUTO: 36.3 %
NONHDLC SERPL-MCNC: 144 MG/DL
PLATELET # BLD AUTO: 290 K/UL
POTASSIUM SERPL-SCNC: 4 MMOL/L
PROT SERPL-MCNC: 8.8 G/DL
RBC # BLD: 4.89 M/UL
RBC # FLD: 13.2 %
SODIUM SERPL-SCNC: 142 MMOL/L
TRIGL SERPL-MCNC: 128 MG/DL
WBC # FLD AUTO: 6.64 K/UL

## 2023-09-20 DIAGNOSIS — R76.8 OTHER SPECIFIED ABNORMAL IMMUNOLOGICAL FINDINGS IN SERUM: ICD-10-CM

## 2023-09-20 DIAGNOSIS — H57.89 OTHER SPECIFIED DISORDERS OF EYE AND ADNEXA: ICD-10-CM

## 2023-09-20 DIAGNOSIS — M06.9 RHEUMATOID ARTHRITIS, UNSPECIFIED: ICD-10-CM

## 2023-09-20 DIAGNOSIS — M93.1 KIENBOCK'S DISEASE OF ADULTS: ICD-10-CM

## 2023-09-25 ENCOUNTER — NON-APPOINTMENT (OUTPATIENT)
Age: 68
End: 2023-09-25

## 2023-12-11 ENCOUNTER — OUTPATIENT (OUTPATIENT)
Dept: OUTPATIENT SERVICES | Facility: HOSPITAL | Age: 68
LOS: 1 days | End: 2023-12-11

## 2023-12-11 ENCOUNTER — APPOINTMENT (OUTPATIENT)
Dept: RHEUMATOLOGY | Facility: CLINIC | Age: 68
End: 2023-12-11
Payer: MEDICARE

## 2023-12-11 VITALS
HEART RATE: 108 BPM | BODY MASS INDEX: 32.47 KG/M2 | SYSTOLIC BLOOD PRESSURE: 130 MMHG | WEIGHT: 202 LBS | OXYGEN SATURATION: 96 % | DIASTOLIC BLOOD PRESSURE: 68 MMHG | HEIGHT: 66 IN

## 2023-12-11 DIAGNOSIS — Z98.890 OTHER SPECIFIED POSTPROCEDURAL STATES: Chronic | ICD-10-CM

## 2023-12-11 DIAGNOSIS — R76.8 OTHER SPECIFIED ABNORMAL IMMUNOLOGICAL FINDINGS IN SERUM: ICD-10-CM

## 2023-12-11 LAB
M TB IFN-G BLD-IMP: NEGATIVE
QUANTIFERON TB PLUS MITOGEN MINUS NIL: 8.22 IU/ML
QUANTIFERON TB PLUS NIL: 0.06 IU/ML
QUANTIFERON TB PLUS TB1 MINUS NIL: -0.01 IU/ML
QUANTIFERON TB PLUS TB2 MINUS NIL: 0.02 IU/ML

## 2023-12-11 PROCEDURE — 99214 OFFICE O/P EST MOD 30 MIN: CPT | Mod: GC

## 2023-12-13 NOTE — HISTORY OF PRESENT ILLNESS
[FreeTextEntry1] : Interval History _______________________________________________________ Doing well this visit.  Denies joint pain, joint swelling, morning stiffness, SOB, cough, CP, neck pain, or bilateral upper extremity parasethesias.  Finds enbrel is controlling her RA, reports pt global assessment score as 0.  Did not make an appt with orth hand for MRI L hand findings, denies L hand/wrist hand pain  Has not gotten flu or COVID shot yet.

## 2023-12-13 NOTE — ASSESSMENT
[FreeTextEntry1] : 67 yo F with hx of RA (seropositive, non erosive), OA knees b/l, L Lewis Cyst, LTBI s/p rifampin x2.5mo in 2018, pulmonary nodules,DM2, here for follow up.  # RA (seropositive, non-erosive) -CDAI 20today - continue Enbrel SQ weekly (6/2019- ) - not on Arava (d/c due to GI intolerance), not on MTX (d/c due to urticarial rash) - Xrays last checked Feb 2023 - check labs to monitor for disease activity and medication toxicity  #C/f L wrist osteonecrosis - Xray hands and wrists 02/23 with no erosions but with concern for osteonecrosis in left lunate - MRI of L hand/wrist demonstrating c/f osteonecrosis of L lunate - Pt with no pain or symptoms, okay to hold off on ortho follow up for now   # b/l knee OA (L> R) - responded well to steroid injection in the past, most recent injection > 1 year ago - s/p L. knee Depomedrol 40mg injection in office 2/6/23, pt tolerated well - s/p L. knee triamcinolone acetonide 40mg injection in office 5/15/23, pt tolerated well  # Hx of LTBI? S/p treatment with Rifampin for 2.5 months previously based on +ve Quant gold. - repeat quant gold neg Sept 2023   #Hx of positive Hep B core ab -Total IgM negative -Check Hep B PCR q3 month (has been negative)    # Hx of pulmonary nodules. - Stable upper lobe nodule, last CT in 2019.  # HCM = PCV13 - 7/2019 = PPSV- 11/4/2019 = covid vaccine: advised pt to get, will consider =Flu shot- will get at local [facility]  = PCP for cancer screening = DEXA wnl in 2022 Spine: -0.4, Normal. Femoral neck: -0.6 , Normal. Total hip: 0.2 , Normal.   Case discussed with Dr. Patti Hadley MD PGY-4 Rheumatology.

## 2023-12-14 DIAGNOSIS — M06.9 RHEUMATOID ARTHRITIS, UNSPECIFIED: ICD-10-CM

## 2023-12-14 DIAGNOSIS — Z79.899 OTHER LONG TERM (CURRENT) DRUG THERAPY: ICD-10-CM

## 2023-12-14 DIAGNOSIS — R76.8 OTHER SPECIFIED ABNORMAL IMMUNOLOGICAL FINDINGS IN SERUM: ICD-10-CM

## 2024-01-03 RX ORDER — ETANERCEPT 50 MG/ML
50 SOLUTION SUBCUTANEOUS
Qty: 12 | Refills: 2 | Status: ACTIVE | COMMUNITY
Start: 2020-10-12 | End: 1900-01-01

## 2024-01-24 LAB
BASOPHILS # BLD AUTO: 0.05 K/UL
BASOPHILS NFR BLD AUTO: 0.6 %
EOSINOPHIL # BLD AUTO: 0.09 K/UL
EOSINOPHIL NFR BLD AUTO: 1.2 %
ERYTHROCYTE [SEDIMENTATION RATE] IN BLOOD BY WESTERGREN METHOD: 103 MM/HR
HBV CORE IGG+IGM SER QL: REACTIVE
HCT VFR BLD CALC: 41.2 %
HGB BLD-MCNC: 13.4 G/DL
IMM GRANULOCYTES NFR BLD AUTO: 0.4 %
LYMPHOCYTES # BLD AUTO: 3.6 K/UL
LYMPHOCYTES NFR BLD AUTO: 46.6 %
MAN DIFF?: NORMAL
MCHC RBC-ENTMCNC: 29.1 PG
MCHC RBC-ENTMCNC: 32.5 GM/DL
MCV RBC AUTO: 89.4 FL
MONOCYTES # BLD AUTO: 0.67 K/UL
MONOCYTES NFR BLD AUTO: 8.7 %
NEUTROPHILS # BLD AUTO: 3.28 K/UL
NEUTROPHILS NFR BLD AUTO: 42.5 %
PLATELET # BLD AUTO: 334 K/UL
RBC # BLD: 4.61 M/UL
RBC # FLD: 13.6 %
WBC # FLD AUTO: 7.72 K/UL

## 2024-02-15 LAB
ALBUMIN SERPL ELPH-MCNC: 4.5 G/DL
ALP BLD-CCNC: 98 U/L
ALT SERPL-CCNC: 16 U/L
ANION GAP SERPL CALC-SCNC: 16 MMOL/L
AST SERPL-CCNC: 19 U/L
BILIRUB SERPL-MCNC: 0.3 MG/DL
BUN SERPL-MCNC: 16 MG/DL
CALCIUM SERPL-MCNC: 10.9 MG/DL
CHLORIDE SERPL-SCNC: 101 MMOL/L
CO2 SERPL-SCNC: 23 MMOL/L
CREAT SERPL-MCNC: 0.65 MG/DL
CRP SERPL-MCNC: <3 MG/L
EGFR: 95 ML/MIN/1.73M2
GLUCOSE SERPL-MCNC: 119 MG/DL
HEPB DNA PCR INT: NOT DETECTED
HEPB DNA PCR LOG: NOT DETECTED LOGIU/ML
POTASSIUM SERPL-SCNC: 3.8 MMOL/L
PROT SERPL-MCNC: 8.7 G/DL
SODIUM SERPL-SCNC: 140 MMOL/L

## 2024-02-23 NOTE — H&P ADULT - NEGATIVE RESPIRATORY AND THORAX SYMPTOMS
RT EQUIPMENT DEVICE RELATED - SKIN ASSESSMENT    RT Medical Equipment/Device:     High Flow Nasal Cannula:Airvo    Skin Assessment:      Cheek:  Intact  Ears:  Intact  Nares:  Intact  Nose:  Intact    Device Skin Pressure Protection:  Pressure points protected    Nurse Notification:  Lizette Rodríguez, RRT    no dyspnea/no wheezing/no cough/no hemoptysis

## 2024-03-18 ENCOUNTER — APPOINTMENT (OUTPATIENT)
Dept: RHEUMATOLOGY | Facility: CLINIC | Age: 69
End: 2024-03-18
Payer: MEDICARE

## 2024-03-18 ENCOUNTER — OUTPATIENT (OUTPATIENT)
Dept: OUTPATIENT SERVICES | Facility: HOSPITAL | Age: 69
LOS: 1 days | End: 2024-03-18

## 2024-03-18 ENCOUNTER — RESULT REVIEW (OUTPATIENT)
Age: 69
End: 2024-03-18

## 2024-03-18 VITALS
HEART RATE: 94 BPM | DIASTOLIC BLOOD PRESSURE: 79 MMHG | OXYGEN SATURATION: 96 % | BODY MASS INDEX: 32.47 KG/M2 | WEIGHT: 202 LBS | HEIGHT: 66 IN | SYSTOLIC BLOOD PRESSURE: 156 MMHG

## 2024-03-18 DIAGNOSIS — R70.0 ELEVATED ERYTHROCYTE SEDIMENTATION RATE: ICD-10-CM

## 2024-03-18 DIAGNOSIS — M54.42 LUMBAGO WITH SCIATICA, LEFT SIDE: ICD-10-CM

## 2024-03-18 DIAGNOSIS — Z98.890 OTHER SPECIFIED POSTPROCEDURAL STATES: Chronic | ICD-10-CM

## 2024-03-18 PROCEDURE — 99214 OFFICE O/P EST MOD 30 MIN: CPT | Mod: GC

## 2024-03-18 NOTE — ASSESSMENT
[FreeTextEntry1] : 67 yo F with hx of RA (seropositive, non erosive), OA knees b/l, L Lewis Cyst, LTBI s/p rifampin x2.5mo in 2018, pulmonary nodules,DM2, here for follow up.  # RA (seropositive, non-erosive) - continue Enbrel SQ weekly (6/2019- ) - not on Arava (d/c due to GI intolerance), not on MTX (d/c due to urticarial rash) - Xrays last checked Feb 2023 - check labs to monitor for disease activity and medication toxicity  # Left-sided low back pain with sciatica / Lateral pain of left hip  - xray left hip and SI joint - PT referral  - Start Cymbalta 20mg daily for pain management   # Persistent elevated ESR and lymphocytosis - SPE/IPEP/ UPEP and repeat CBC  # Chronic cough in patient with RA- r/o ILD #Hx of Pulm nodules  -Last CT chest 2019 with stable pulm nodules  -Ordered repeat CT chest to r/o ILD  # C/f L wrist osteonecrosis - Xray hands and wrists 02/23 with no erosions but with concern for osteonecrosis in left lunate - MRI of L hand/wrist demonstrating c/f osteonecrosis of L lunate - Pt with no pain or symptoms, okay to hold off on ortho follow up for now   # b/l knee OA (L> R) - responded well to steroid injection in the past, most recent injection > 1 year ago - s/p L. knee Depomedrol 40mg injection in office 2/6/23, pt tolerated well - s/p L. knee triamcinolone acetonide 40mg injection in office 5/15/23, pt tolerated well  # Hx of LTBI  S/p treatment with Rifampin for 2.5 months previously based on +ve Quant gold. - repeat quant gold neg Sept 2023   #Hx of positive Hep B core ab -Total IgM negative -Check Hep B PCR q3 month (has been negative)   # HCM = PCV13 - 7/2019 = PPSV- 11/4/2019 = covid vaccine: advised pt to get, will consider =Flu shot- will get at local [facility]  = PCP for cancer screening = DEXA wnl in 2022 Spine: -0.4, Normal. Femoral neck: -0.6 , Normal. Total hip: 0.2 , Normal. -Per pt, completed repeat DEXA with PCP last week. Ask pt to bring report in next visit    Case discussed with Dr. Patti Hadley MD PGY-4 Rheumatology.

## 2024-03-18 NOTE — HISTORY OF PRESENT ILLNESS
[FreeTextEntry1] : Interval History : ______________ [id number]:  Complaining of L buttock pain that radiates down her whole L leg. Was told in the past that she has a history of sciatica, however has been worse over the last 2 months. Taking tylenol arthritis with only minimal relief. Has not done PT in the past.  Also complaining of nightly cough and voice hoarseness. Following with her PCP. Currently on omeprazole without improvement. Denies SOB.  Continues to otherwise do well on Enbrel. Denies morning stiffness, joint pain or joint swelling.

## 2024-03-18 NOTE — PHYSICAL EXAM
[General Appearance - In No Acute Distress] : in no acute distress [Neck Appearance] : the appearance of the neck was normal [Extraocular Movements] : extraocular movements were intact [Auscultation Breath Sounds / Voice Sounds] : lungs were clear to auscultation bilaterally [Heart Rate And Rhythm] : heart rate was normal and rhythm regular [Heart Sounds] : normal S1 and S2 [] : no rash [Oriented To Time, Place, And Person] : oriented to person, place, and time [FreeTextEntry1] : TTP in L buttock. Negative straight leg test. No TTP on L trochanter.  Bilateral knees with crepitus, no swelling or pain.  No synovitis in hands, wrists, elbows

## 2024-03-19 DIAGNOSIS — R70.0 ELEVATED ERYTHROCYTE SEDIMENTATION RATE: ICD-10-CM

## 2024-03-19 DIAGNOSIS — M54.42 LUMBAGO WITH SCIATICA, LEFT SIDE: ICD-10-CM

## 2024-03-19 DIAGNOSIS — M06.9 RHEUMATOID ARTHRITIS, UNSPECIFIED: ICD-10-CM

## 2024-03-19 DIAGNOSIS — Z79.899 OTHER LONG TERM (CURRENT) DRUG THERAPY: ICD-10-CM

## 2024-03-19 DIAGNOSIS — M25.552 PAIN IN LEFT HIP: ICD-10-CM

## 2024-03-23 ENCOUNTER — TRANSCRIPTION ENCOUNTER (OUTPATIENT)
Age: 69
End: 2024-03-23

## 2024-04-11 ENCOUNTER — NON-APPOINTMENT (OUTPATIENT)
Age: 69
End: 2024-04-11

## 2024-04-11 LAB
ALBUMIN MFR SERPL ELPH: 49.4 %
ALBUMIN SERPL ELPH-MCNC: 4.6 G/DL
ALBUMIN SERPL-MCNC: 4.2 G/DL
ALBUMIN/GLOB SERPL: 1 RATIO
ALP BLD-CCNC: 111 U/L
ALPHA1 GLOB MFR SERPL ELPH: 3.4 %
ALPHA1 GLOB SERPL ELPH-MCNC: 0.3 G/DL
ALPHA2 GLOB MFR SERPL ELPH: 10.1 %
ALPHA2 GLOB SERPL ELPH-MCNC: 0.9 G/DL
ALT SERPL-CCNC: 28 U/L
ANION GAP SERPL CALC-SCNC: 15 MMOL/L
AST SERPL-CCNC: 24 U/L
B-GLOBULIN MFR SERPL ELPH: 14 %
B-GLOBULIN SERPL ELPH-MCNC: 1.2 G/DL
BASOPHILS # BLD AUTO: 0.07 K/UL
BASOPHILS NFR BLD AUTO: 1 %
BILIRUB SERPL-MCNC: 0.4 MG/DL
BUN SERPL-MCNC: 10 MG/DL
CALCIUM SERPL-MCNC: 10.6 MG/DL
CHLORIDE SERPL-SCNC: 101 MMOL/L
CO2 SERPL-SCNC: 26 MMOL/L
CREAT SERPL-MCNC: 0.67 MG/DL
CRP SERPL-MCNC: <3 MG/L
EGFR: 95 ML/MIN/1.73M2
EOSINOPHIL # BLD AUTO: 0.12 K/UL
EOSINOPHIL NFR BLD AUTO: 1.7 %
ERYTHROCYTE [SEDIMENTATION RATE] IN BLOOD BY WESTERGREN METHOD: 93 MM/HR
GAMMA GLOB FLD ELPH-MCNC: 2 G/DL
GAMMA GLOB MFR SERPL ELPH: 23.1 %
GLUCOSE SERPL-MCNC: 124 MG/DL
HCT VFR BLD CALC: 41.9 %
HGB BLD-MCNC: 13.1 G/DL
IMM GRANULOCYTES NFR BLD AUTO: 0.3 %
INTERPRETATION SERPL IEP-IMP: NORMAL
LYMPHOCYTES # BLD AUTO: 3.5 K/UL
LYMPHOCYTES NFR BLD AUTO: 48.6 %
M PROTEIN SPEC IFE-MCNC: NORMAL
MAN DIFF?: NORMAL
MCHC RBC-ENTMCNC: 28.4 PG
MCHC RBC-ENTMCNC: 31.3 GM/DL
MCV RBC AUTO: 90.9 FL
MONOCYTES # BLD AUTO: 0.65 K/UL
MONOCYTES NFR BLD AUTO: 9 %
NEUTROPHILS # BLD AUTO: 2.84 K/UL
NEUTROPHILS NFR BLD AUTO: 39.4 %
PLATELET # BLD AUTO: 269 K/UL
POTASSIUM SERPL-SCNC: 3.8 MMOL/L
PROT SERPL-MCNC: 8.5 G/DL
RBC # BLD: 4.61 M/UL
RBC # FLD: 13 %
SODIUM SERPL-SCNC: 142 MMOL/L
WBC # FLD AUTO: 7.2 K/UL

## 2024-04-27 ENCOUNTER — OUTPATIENT (OUTPATIENT)
Dept: OUTPATIENT SERVICES | Facility: HOSPITAL | Age: 69
LOS: 1 days | End: 2024-04-27
Payer: COMMERCIAL

## 2024-04-27 ENCOUNTER — APPOINTMENT (OUTPATIENT)
Dept: RADIOLOGY | Facility: IMAGING CENTER | Age: 69
End: 2024-04-27
Payer: MEDICARE

## 2024-04-27 DIAGNOSIS — M25.552 PAIN IN LEFT HIP: ICD-10-CM

## 2024-04-27 DIAGNOSIS — Z98.890 OTHER SPECIFIED POSTPROCEDURAL STATES: Chronic | ICD-10-CM

## 2024-04-27 PROCEDURE — 73502 X-RAY EXAM HIP UNI 2-3 VIEWS: CPT

## 2024-04-27 PROCEDURE — 73502 X-RAY EXAM HIP UNI 2-3 VIEWS: CPT | Mod: 26,LT

## 2024-04-28 ENCOUNTER — APPOINTMENT (OUTPATIENT)
Dept: CT IMAGING | Facility: IMAGING CENTER | Age: 69
End: 2024-04-28
Payer: MEDICARE

## 2024-04-28 ENCOUNTER — OUTPATIENT (OUTPATIENT)
Dept: OUTPATIENT SERVICES | Facility: HOSPITAL | Age: 69
LOS: 1 days | End: 2024-04-28
Payer: COMMERCIAL

## 2024-04-28 DIAGNOSIS — Z98.890 OTHER SPECIFIED POSTPROCEDURAL STATES: Chronic | ICD-10-CM

## 2024-04-28 DIAGNOSIS — M06.9 RHEUMATOID ARTHRITIS, UNSPECIFIED: ICD-10-CM

## 2024-04-28 DIAGNOSIS — R91.1 SOLITARY PULMONARY NODULE: ICD-10-CM

## 2024-04-28 PROCEDURE — 71250 CT THORAX DX C-: CPT | Mod: 26

## 2024-04-28 PROCEDURE — 71250 CT THORAX DX C-: CPT

## 2024-05-06 ENCOUNTER — APPOINTMENT (OUTPATIENT)
Dept: RHEUMATOLOGY | Facility: CLINIC | Age: 69
End: 2024-05-06
Payer: MEDICARE

## 2024-05-06 ENCOUNTER — OUTPATIENT (OUTPATIENT)
Dept: OUTPATIENT SERVICES | Facility: HOSPITAL | Age: 69
LOS: 1 days | End: 2024-05-06

## 2024-05-06 ENCOUNTER — LABORATORY RESULT (OUTPATIENT)
Age: 69
End: 2024-05-06

## 2024-05-06 VITALS
WEIGHT: 200 LBS | BODY MASS INDEX: 32.14 KG/M2 | HEART RATE: 94 BPM | DIASTOLIC BLOOD PRESSURE: 70 MMHG | OXYGEN SATURATION: 96 % | HEIGHT: 66 IN | SYSTOLIC BLOOD PRESSURE: 130 MMHG

## 2024-05-06 DIAGNOSIS — M25.552 PAIN IN LEFT HIP: ICD-10-CM

## 2024-05-06 DIAGNOSIS — Z79.899 OTHER LONG TERM (CURRENT) DRUG THERAPY: ICD-10-CM

## 2024-05-06 DIAGNOSIS — E83.52 HYPERCALCEMIA: ICD-10-CM

## 2024-05-06 DIAGNOSIS — M06.9 RHEUMATOID ARTHRITIS, UNSPECIFIED: ICD-10-CM

## 2024-05-06 DIAGNOSIS — Z98.890 OTHER SPECIFIED POSTPROCEDURAL STATES: Chronic | ICD-10-CM

## 2024-05-06 PROCEDURE — G2211 COMPLEX E/M VISIT ADD ON: CPT | Mod: GC

## 2024-05-06 PROCEDURE — 99214 OFFICE O/P EST MOD 30 MIN: CPT | Mod: GC

## 2024-05-06 RX ORDER — NAPROXEN 250 MG/1
250 TABLET ORAL
Qty: 60 | Refills: 0 | Status: ACTIVE | COMMUNITY
Start: 2024-05-06 | End: 1900-01-01

## 2024-05-06 NOTE — ASSESSMENT
[FreeTextEntry1] : 67 yo F with hx of RA (seropositive, non erosive), OA knees b/l, L Lewis Cyst, LTBI s/p rifampin x2.5mo in 2018, pulmonary nodules,DM2, here for follow up.  # RA (seropositive, non-erosive) - continue Enbrel SQ weekly (6/2019- ) - not on Arava (d/c due to GI intolerance), not on MTX (d/c due to urticarial rash) - Xrays last checked Feb 2023 - check labs to monitor for disease activity and medication toxicity  # L hip pain (improving)  - xray pelvis with no hip/SI joint abnormality, however lumbar spine with degenerative disease  - C/w PT  - C/w Cymbalta 20mg daily for pain management   #Hypercalcemia -iPTH ordered   # Persistent elevated ESR and lymphocytosis - Evaluated for Large granular lymphocyte leukemia (rare complication of RA) - SPEP w/ polyclonal gammopathy, serum immunofixation with no monoclonal gammopathy.  - Monitor CBC   # Chronic cough in patient with RA- r/o ILD #Hx of Pulm nodules  -Last CT chest 2019 with stable pulm nodules   -CT chest completed, 5/2024- personally reviewed - final read still pending   # C/f L wrist osteonecrosis - Xray hands and wrists 02/23 with no erosions but with concern for osteonecrosis in left lunate - MRI of L hand/wrist demonstrating c/f osteonecrosis of L lunate - Pt with no pain or symptoms, okay to hold off on ortho follow up for now   # b/l knee OA (L> R) - responded well to steroid injection in the past, most recent injection > 1 year ago - s/p L. knee Depomedrol 40mg injection in office 2/6/23, pt tolerated well - s/p L. knee triamcinolone acetonide 40mg injection in office 5/15/23, pt tolerated well  # Hx of LTBI  S/p treatment with Rifampin for 2.5 months previously based on +ve Quant gold. - repeat quant gold neg Sept 2023   #Hx of positive Hep B core ab -Total IgM negative -Check Hep B PCR q3 month (has been negative)   # HCM = PCV13 - 7/2019 = PPSV- 11/4/2019 = covid vaccine: advised pt to get, will consider =Flu shot- will get at local [facility]  = PCP for cancer screening = DEXA wnl in 2022 Spine: -0.4, Normal. Femoral neck: -0.6 , Normal. Total hip: 0.2 , Normal. -Per pt, completed repeat DEXA with PCP. Ask pt to bring report in next visit    Case discussed with Dr. Patti Hadley MD PGY-4 Rheumatology.

## 2024-05-06 NOTE — PHYSICAL EXAM
[General Appearance - In No Acute Distress] : in no acute distress [Extraocular Movements] : extraocular movements were intact [Neck Appearance] : the appearance of the neck was normal [Auscultation Breath Sounds / Voice Sounds] : lungs were clear to auscultation bilaterally [Heart Rate And Rhythm] : heart rate was normal and rhythm regular [Heart Sounds] : normal S1 and S2 [] : no rash [Oriented To Time, Place, And Person] : oriented to person, place, and time [No Spinal Tenderness] : no spinal tenderness [Musculoskeletal - Swelling] : no joint swelling seen [FreeTextEntry1] : Negative straight leg test. Mild TTP in left trochanter area.  Bilateral knees with crepitus, no swelling or pain.  No synovitis in hands, wrists, elbows

## 2024-05-06 NOTE — HISTORY OF PRESENT ILLNESS
[FreeTextEntry1] : Interval History: ___________________________________________ 05/2024:  Last seen in March 2024. S/p pelvis xrays to evaluate for L hip pain. No pelvic or Si joint abnormalities, however demonstrated advanced lower lumbar spine degenerative changes.  Started PT and cymbalta 20mg daily, overall reports significant improvement in pain symptoms. Continues to have mild L hip pain.  Finds RA is well controlled with Enbrel- denies joint swelling or morning stiffness.

## 2024-05-07 DIAGNOSIS — M06.9 RHEUMATOID ARTHRITIS, UNSPECIFIED: ICD-10-CM

## 2024-05-07 DIAGNOSIS — E83.52 HYPERCALCEMIA: ICD-10-CM

## 2024-05-07 DIAGNOSIS — Z79.899 OTHER LONG TERM (CURRENT) DRUG THERAPY: ICD-10-CM

## 2024-05-07 DIAGNOSIS — M25.552 PAIN IN LEFT HIP: ICD-10-CM

## 2024-05-16 ENCOUNTER — RX RENEWAL (OUTPATIENT)
Age: 69
End: 2024-05-16

## 2024-05-16 RX ORDER — DULOXETINE HYDROCHLORIDE 20 MG/1
20 CAPSULE, DELAYED RELEASE PELLETS ORAL
Qty: 30 | Refills: 3 | Status: ACTIVE | COMMUNITY
Start: 2024-03-18 | End: 1900-01-01

## 2024-05-21 ENCOUNTER — NON-APPOINTMENT (OUTPATIENT)
Age: 69
End: 2024-05-21

## 2024-05-22 LAB
ALBUMIN SERPL ELPH-MCNC: 4.5 G/DL
ALP BLD-CCNC: 108 U/L
ALT SERPL-CCNC: 21 U/L
ANION GAP SERPL CALC-SCNC: 11 MMOL/L
AST SERPL-CCNC: 16 U/L
BASOPHILS # BLD AUTO: 0.09 K/UL
BASOPHILS NFR BLD AUTO: 1.3 %
BILIRUB SERPL-MCNC: 0.3 MG/DL
BUN SERPL-MCNC: 15 MG/DL
CALCIUM SERPL-MCNC: 10.4 MG/DL
CALCIUM SERPL-MCNC: 10.4 MG/DL
CHLORIDE SERPL-SCNC: 100 MMOL/L
CO2 SERPL-SCNC: 28 MMOL/L
CREAT SERPL-MCNC: 0.72 MG/DL
CRP SERPL-MCNC: <3 MG/L
EGFR: 90 ML/MIN/1.73M2
EOSINOPHIL # BLD AUTO: 0.11 K/UL
EOSINOPHIL NFR BLD AUTO: 1.6 %
ERYTHROCYTE [SEDIMENTATION RATE] IN BLOOD BY WESTERGREN METHOD: 97 MM/HR
GLUCOSE SERPL-MCNC: 106 MG/DL
HAV IGM SER QL: NONREACTIVE
HBV CORE IGG+IGM SER QL: REACTIVE
HBV CORE IGM SER QL: NONREACTIVE
HBV SURFACE AG SER QL: NONREACTIVE
HCT VFR BLD CALC: 40.3 %
HCV AB SER QL: NONREACTIVE
HCV S/CO RATIO: 0.17 S/CO
HGB BLD-MCNC: 12.7 G/DL
IMM GRANULOCYTES NFR BLD AUTO: 0.1 %
LYMPHOCYTES # BLD AUTO: 3.96 K/UL
LYMPHOCYTES NFR BLD AUTO: 59.4 %
M TB IFN-G BLD-IMP: NEGATIVE
MAN DIFF?: NORMAL
MCHC RBC-ENTMCNC: 28.4 PG
MCHC RBC-ENTMCNC: 31.5 GM/DL
MCV RBC AUTO: 90.2 FL
MONOCYTES # BLD AUTO: 0.74 K/UL
MONOCYTES NFR BLD AUTO: 11.1 %
NEUTROPHILS # BLD AUTO: 1.76 K/UL
NEUTROPHILS NFR BLD AUTO: 26.5 %
PARATHYROID HORMONE INTACT: 58 PG/ML
PLATELET # BLD AUTO: 284 K/UL
POTASSIUM SERPL-SCNC: 3.7 MMOL/L
PROT SERPL-MCNC: 8.4 G/DL
QUANTIFERON TB PLUS MITOGEN MINUS NIL: >10 IU/ML
QUANTIFERON TB PLUS NIL: 0.02 IU/ML
QUANTIFERON TB PLUS TB1 MINUS NIL: 0.01 IU/ML
QUANTIFERON TB PLUS TB2 MINUS NIL: 0.01 IU/ML
RBC # BLD: 4.47 M/UL
RBC # FLD: 13 %
SODIUM SERPL-SCNC: 139 MMOL/L
WBC # FLD AUTO: 6.67 K/UL

## 2024-06-27 ENCOUNTER — NON-APPOINTMENT (OUTPATIENT)
Age: 69
End: 2024-06-27

## 2024-06-27 ENCOUNTER — APPOINTMENT (OUTPATIENT)
Dept: CARDIOLOGY | Facility: CLINIC | Age: 69
End: 2024-06-27
Payer: MEDICARE

## 2024-06-27 VITALS
RESPIRATION RATE: 16 BRPM | SYSTOLIC BLOOD PRESSURE: 129 MMHG | BODY MASS INDEX: 31.5 KG/M2 | WEIGHT: 196 LBS | HEART RATE: 91 BPM | DIASTOLIC BLOOD PRESSURE: 74 MMHG | HEIGHT: 66 IN

## 2024-06-27 DIAGNOSIS — R01.1 CARDIAC MURMUR, UNSPECIFIED: ICD-10-CM

## 2024-06-27 DIAGNOSIS — R94.31 ABNORMAL ELECTROCARDIOGRAM [ECG] [EKG]: ICD-10-CM

## 2024-06-27 DIAGNOSIS — I38 ENDOCARDITIS, VALVE UNSPECIFIED: ICD-10-CM

## 2024-06-27 DIAGNOSIS — E66.9 OBESITY, UNSPECIFIED: ICD-10-CM

## 2024-06-27 DIAGNOSIS — I10 ESSENTIAL (PRIMARY) HYPERTENSION: ICD-10-CM

## 2024-06-27 DIAGNOSIS — R00.2 PALPITATIONS: ICD-10-CM

## 2024-06-27 DIAGNOSIS — E11.9 TYPE 2 DIABETES MELLITUS W/OUT COMPLICATIONS: ICD-10-CM

## 2024-06-27 DIAGNOSIS — R42 DIZZINESS AND GIDDINESS: ICD-10-CM

## 2024-06-27 PROCEDURE — 99214 OFFICE O/P EST MOD 30 MIN: CPT

## 2024-06-27 PROCEDURE — 93000 ELECTROCARDIOGRAM COMPLETE: CPT

## 2024-07-29 ENCOUNTER — APPOINTMENT (OUTPATIENT)
Dept: CARDIOLOGY | Facility: CLINIC | Age: 69
End: 2024-07-29
Payer: MEDICARE

## 2024-07-29 PROCEDURE — 93306 TTE W/DOPPLER COMPLETE: CPT

## 2024-07-29 PROCEDURE — 93880 EXTRACRANIAL BILAT STUDY: CPT

## 2024-08-12 ENCOUNTER — APPOINTMENT (OUTPATIENT)
Dept: RHEUMATOLOGY | Facility: CLINIC | Age: 69
End: 2024-08-12

## 2024-08-12 VITALS
WEIGHT: 198 LBS | HEIGHT: 66 IN | SYSTOLIC BLOOD PRESSURE: 140 MMHG | DIASTOLIC BLOOD PRESSURE: 60 MMHG | HEART RATE: 106 BPM | OXYGEN SATURATION: 97 % | BODY MASS INDEX: 31.82 KG/M2

## 2024-08-12 DIAGNOSIS — Z79.899 OTHER LONG TERM (CURRENT) DRUG THERAPY: ICD-10-CM

## 2024-08-12 DIAGNOSIS — M06.9 RHEUMATOID ARTHRITIS, UNSPECIFIED: ICD-10-CM

## 2024-08-12 DIAGNOSIS — M70.72 OTHER BURSITIS OF HIP, LEFT HIP: ICD-10-CM

## 2024-08-12 DIAGNOSIS — M25.552 PAIN IN LEFT HIP: ICD-10-CM

## 2024-08-12 PROCEDURE — 99214 OFFICE O/P EST MOD 30 MIN: CPT | Mod: 25

## 2024-08-12 PROCEDURE — 20610 DRAIN/INJ JOINT/BURSA W/O US: CPT | Mod: LT

## 2024-08-12 RX ORDER — METHYLPRED ACET/NACL,ISO-OS/PF 40 MG/ML
40 VIAL (ML) INJECTION
Refills: 0 | Status: COMPLETED | OUTPATIENT
Start: 2024-08-12

## 2024-08-12 RX ORDER — LIDOCAINE HYDROCHLORIDE 10 MG/ML
1 INJECTION, SOLUTION INFILTRATION; PERINEURAL
Refills: 0 | Status: COMPLETED | OUTPATIENT
Start: 2024-08-12

## 2024-08-12 RX ADMIN — METHYLPREDNISOLONE ACETATE MG/ML: 40 INJECTION, SUSPENSION INTRA-ARTICULAR; INTRALESIONAL; INTRAMUSCULAR; SOFT TISSUE at 00:00

## 2024-08-12 RX ADMIN — LIDOCAINE HYDROCHLORIDE %: 10 INJECTION, SOLUTION INFILTRATION; PERINEURAL at 00:00

## 2024-08-12 NOTE — PROCEDURE
[Today's Date:] : Date: [unfilled] [Risks] : risks [Benefits] : benefits [Consent Obtained] : written consent was obtained prior to the procedure and is detailed in the patient's record [Patient] : Prior to the start of the procedure a time out was taken and the identity of the patient was confirmed via name and date of birth with the patient. The correct site and the procedure to be performed were confirmed. The correct side was confirmed if applicable. The availability of the correct equipment was verified [Therapeutic] : therapeutic [#1 Site: ______] : #1 site identified in the [unfilled] [Ethyl Chloride] : ethyl chloride [___ ml Inj] : [unfilled] ~Uml [1%] : 1%  [Betadine] : betadine solution [Chlorhexidine] : chlorhexidine [Depomedrol ___ mg] : Depomedrol [unfilled] mg [Tolerated Well] : the patient tolerated the procedure well [No Complications] : there were no complications [Instructions Given] : handouts/patient instructions were given to patient [FreeTextEntry7] : Lot [id number] 12/26  [de-identified] : 20 guage 2 inch  [de-identified] : Lot [id number] 08/2024

## 2024-08-12 NOTE — ASSESSMENT
[FreeTextEntry1] : 69 yo F with hx of RA (seropositive, non erosive), OA knees b/l, L Lewis Cyst, LTBI s/p rifampin x2.5mo in 2018, pulmonary nodules,DM2, here for follow up.  # RA (seropositive, non-erosive) - continue Enbrel SQ weekly (6/2019- ) - Xrays last checked Feb 2023 - check labs to monitor for disease activity and medication toxicity  # Chronic L hip pain/buttock pain  -Unclear etiology, could be 2/2 L ischial bursitis as pt points directly over lower L buttock region and has pain on palpation -S/p L ischial bursa injection today (see procedure note) -If does not improve with steroid injection, pt will get MRI of pelvis and L hip to assess for other etiology such as AVN, subacute fractures (both not seen on xrays done in April) and/or gluteal muscle tear.  -Pt has not improved with physical therapy since April or conservative management (such as NSAID, Tylenol or Duloxetine)    #Hypercalcemia - iPTH wnl   # Persistent elevated ESR and lymphocytosis - Evaluated for Large granular lymphocyte leukemia (rare complication of RA) - SPEP w/ polyclonal gammopathy, serum immunofixation with no monoclonal gammopathy.  - Monitor CBC   # Chronic cough in patient with RA- r/o ILD #Hx of Pulm nodules  -CT chest completed, 5/2024: no evidence of ILD   # C/f L wrist osteonecrosis - Xray hands and wrists 02/23 with no erosions but with concern for osteonecrosis in left lunate - MRI of L hand/wrist demonstrating c/f osteonecrosis of L lunate - Pt with no pain or symptoms, okay to hold off on ortho follow up for now   # b/l knee OA (L> R) - responded well to steroid injection in the past, most recent injection > 1 year ago - s/p L. knee Depomedrol 40mg injection in office 2/6/23, pt tolerated well - s/p L. knee triamcinolone acetonide 40mg injection in office 5/15/23, pt tolerated well  # Hx of LTBI  S/p treatment with Rifampin for 2.5 months previously based on +ve Quant gold. - repeat quant gold neg May 2024   #Hx of positive Hep B core ab -Total IgM negative -Check Hep B PCR q3 month (has been negative)  # HCM = PCV13 - 7/2019 = PPSV- 11/4/2019 = PCP for cancer screening = DEXA wnl in 2022 Spine: -0.4, Normal. Femoral neck: -0.6 , Normal. Total hip: 0.2 , Normal. -Per pt, completed repeat DEXA with PCP. Ask pt to bring report in next visit   RTC in 3 months   Case discussed with Dr. Davida Hadley MD PGY-5 Rheumatology.

## 2024-08-12 NOTE — PHYSICAL EXAM
[General Appearance - In No Acute Distress] : in no acute distress [Extraocular Movements] : extraocular movements were intact [Neck Appearance] : the appearance of the neck was normal [Auscultation Breath Sounds / Voice Sounds] : lungs were clear to auscultation bilaterally [Heart Rate And Rhythm] : heart rate was normal and rhythm regular [Heart Sounds] : normal S1 and S2 [No Spinal Tenderness] : no spinal tenderness [] : no rash [Oriented To Time, Place, And Person] : oriented to person, place, and time [Musculoskeletal - Swelling] : no joint swelling seen [FreeTextEntry1] : Negative straight leg test. TTP over L mid-to lower buttock area. No TTP on L greater trochanter area  Bilateral knees with crepitus, no swelling or pain.  No synovitis in hands, wrists, elbows

## 2024-08-12 NOTE — HISTORY OF PRESENT ILLNESS
[FreeTextEntry1] : Interval History: ___________________________________________ 8/2024:  Continues to have L buttock pain despite physical therapy and conservative management since April. Pain worse with walking, denies significant pain while sitting. Endorses achy pain that starts at flank and goes down to Buttock area. Denies sharp, shooting pain down her L leg, but states her hamstrings can feel achy at times.  Finds her gait has been affected due to pain   RA otherwise well controlled- denies small joint pain, synovitis or morning stiffness

## 2024-08-16 ENCOUNTER — NON-APPOINTMENT (OUTPATIENT)
Age: 69
End: 2024-08-16

## 2024-08-16 LAB
ALBUMIN SERPL ELPH-MCNC: 4.5 G/DL
ALP BLD-CCNC: 114 U/L
ALT SERPL-CCNC: 15 U/L
ANION GAP SERPL CALC-SCNC: 13 MMOL/L
AST SERPL-CCNC: 16 U/L
BASOPHILS # BLD AUTO: 0.07 K/UL
BASOPHILS NFR BLD AUTO: 1 %
BILIRUB SERPL-MCNC: 0.4 MG/DL
BUN SERPL-MCNC: 18 MG/DL
CALCIUM SERPL-MCNC: 10.5 MG/DL
CHLORIDE SERPL-SCNC: 101 MMOL/L
CO2 SERPL-SCNC: 24 MMOL/L
CREAT SERPL-MCNC: 0.76 MG/DL
CRP SERPL-MCNC: <3 MG/L
EGFR: 84 ML/MIN/1.73M2
EOSINOPHIL # BLD AUTO: 0.08 K/UL
EOSINOPHIL NFR BLD AUTO: 1.2 %
ERYTHROCYTE [SEDIMENTATION RATE] IN BLOOD BY WESTERGREN METHOD: 74 MM/HR
GLUCOSE SERPL-MCNC: 122 MG/DL
HCT VFR BLD CALC: 40.4 %
HEPB DNA PCR INT: NOT DETECTED
HEPB DNA PCR LOG: NOT DETECTED LOGIU/ML
HGB BLD-MCNC: 12.9 G/DL
IMM GRANULOCYTES NFR BLD AUTO: 0.4 %
LYMPHOCYTES # BLD AUTO: 3.22 K/UL
LYMPHOCYTES NFR BLD AUTO: 47.1 %
MAN DIFF?: NORMAL
MCHC RBC-ENTMCNC: 28.9 PG
MCHC RBC-ENTMCNC: 31.9 GM/DL
MCV RBC AUTO: 90.6 FL
MONOCYTES # BLD AUTO: 0.63 K/UL
MONOCYTES NFR BLD AUTO: 9.2 %
NEUTROPHILS # BLD AUTO: 2.81 K/UL
NEUTROPHILS NFR BLD AUTO: 41.1 %
PLATELET # BLD AUTO: 318 K/UL
POTASSIUM SERPL-SCNC: 3.7 MMOL/L
PROT SERPL-MCNC: 8.1 G/DL
RBC # BLD: 4.46 M/UL
RBC # FLD: 13.2 %
SODIUM SERPL-SCNC: 139 MMOL/L
WBC # FLD AUTO: 6.84 K/UL

## 2024-11-25 ENCOUNTER — APPOINTMENT (OUTPATIENT)
Dept: RHEUMATOLOGY | Facility: CLINIC | Age: 69
End: 2024-11-25

## 2024-11-25 ENCOUNTER — OUTPATIENT (OUTPATIENT)
Dept: OUTPATIENT SERVICES | Facility: HOSPITAL | Age: 69
LOS: 1 days | End: 2024-11-25

## 2024-11-25 VITALS
SYSTOLIC BLOOD PRESSURE: 147 MMHG | WEIGHT: 195 LBS | OXYGEN SATURATION: 96 % | HEART RATE: 90 BPM | TEMPERATURE: 97.8 F | RESPIRATION RATE: 16 BRPM | BODY MASS INDEX: 31.34 KG/M2 | HEIGHT: 66 IN | DIASTOLIC BLOOD PRESSURE: 65 MMHG

## 2024-11-25 DIAGNOSIS — Z98.890 OTHER SPECIFIED POSTPROCEDURAL STATES: Chronic | ICD-10-CM

## 2024-11-25 DIAGNOSIS — Z79.899 OTHER LONG TERM (CURRENT) DRUG THERAPY: ICD-10-CM

## 2024-11-25 DIAGNOSIS — M06.9 RHEUMATOID ARTHRITIS, UNSPECIFIED: ICD-10-CM

## 2024-11-25 LAB
ALBUMIN SERPL ELPH-MCNC: 4.2 G/DL
ALP BLD-CCNC: 112 U/L
ALT SERPL-CCNC: 12 U/L
ANION GAP SERPL CALC-SCNC: 13 MMOL/L
AST SERPL-CCNC: 18 U/L
BASOPHILS # BLD AUTO: 0.07 K/UL
BASOPHILS NFR BLD AUTO: 1.2 %
BILIRUB SERPL-MCNC: 0.3 MG/DL
BUN SERPL-MCNC: 9 MG/DL
CALCIUM SERPL-MCNC: 10.3 MG/DL
CHLORIDE SERPL-SCNC: 99 MMOL/L
CO2 SERPL-SCNC: 28 MMOL/L
CREAT SERPL-MCNC: 0.72 MG/DL
CRP SERPL-MCNC: <3 MG/L
EGFR: 90 ML/MIN/1.73M2
EOSINOPHIL # BLD AUTO: 0.1 K/UL
EOSINOPHIL NFR BLD AUTO: 1.7 %
ERYTHROCYTE [SEDIMENTATION RATE] IN BLOOD BY WESTERGREN METHOD: 89 MM/HR
GLUCOSE SERPL-MCNC: 103 MG/DL
HCT VFR BLD CALC: 39.9 %
HGB BLD-MCNC: 12.7 G/DL
IMM GRANULOCYTES NFR BLD AUTO: 0 %
LYMPHOCYTES # BLD AUTO: 2.75 K/UL
LYMPHOCYTES NFR BLD AUTO: 46.5 %
MAN DIFF?: NORMAL
MCHC RBC-ENTMCNC: 28.7 PG
MCHC RBC-ENTMCNC: 31.8 G/DL
MCV RBC AUTO: 90.1 FL
MONOCYTES # BLD AUTO: 0.64 K/UL
MONOCYTES NFR BLD AUTO: 10.8 %
NEUTROPHILS # BLD AUTO: 2.36 K/UL
NEUTROPHILS NFR BLD AUTO: 39.8 %
PLATELET # BLD AUTO: 257 K/UL
POTASSIUM SERPL-SCNC: 3.8 MMOL/L
PROT SERPL-MCNC: 8.2 G/DL
RBC # BLD: 4.43 M/UL
RBC # FLD: 12.7 %
SODIUM SERPL-SCNC: 140 MMOL/L
WBC # FLD AUTO: 5.92 K/UL

## 2024-11-25 PROCEDURE — 99214 OFFICE O/P EST MOD 30 MIN: CPT | Mod: GC

## 2024-11-26 LAB
HEPB DNA PCR INT: NOT DETECTED
HEPB DNA PCR LOG: NOT DETECTED LOGIU/ML

## 2024-12-02 ENCOUNTER — APPOINTMENT (OUTPATIENT)
Dept: CARDIOLOGY | Facility: CLINIC | Age: 69
End: 2024-12-02

## 2024-12-02 ENCOUNTER — NON-APPOINTMENT (OUTPATIENT)
Age: 69
End: 2024-12-02

## 2024-12-16 ENCOUNTER — NON-APPOINTMENT (OUTPATIENT)
Age: 69
End: 2024-12-16

## 2025-01-06 ENCOUNTER — RX RENEWAL (OUTPATIENT)
Age: 70
End: 2025-01-06

## 2025-03-03 ENCOUNTER — OUTPATIENT (OUTPATIENT)
Dept: OUTPATIENT SERVICES | Facility: HOSPITAL | Age: 70
LOS: 1 days | End: 2025-03-03

## 2025-03-03 ENCOUNTER — APPOINTMENT (OUTPATIENT)
Dept: RHEUMATOLOGY | Facility: CLINIC | Age: 70
End: 2025-03-03
Payer: MEDICARE

## 2025-03-03 ENCOUNTER — NON-APPOINTMENT (OUTPATIENT)
Age: 70
End: 2025-03-03

## 2025-03-03 VITALS
SYSTOLIC BLOOD PRESSURE: 140 MMHG | BODY MASS INDEX: 30.53 KG/M2 | HEIGHT: 66 IN | OXYGEN SATURATION: 96 % | WEIGHT: 190 LBS | HEART RATE: 91 BPM | DIASTOLIC BLOOD PRESSURE: 70 MMHG | RESPIRATION RATE: 16 BRPM

## 2025-03-03 DIAGNOSIS — R76.8 OTHER SPECIFIED ABNORMAL IMMUNOLOGICAL FINDINGS IN SERUM: ICD-10-CM

## 2025-03-03 DIAGNOSIS — Z79.899 OTHER LONG TERM (CURRENT) DRUG THERAPY: ICD-10-CM

## 2025-03-03 DIAGNOSIS — R05.9 COUGH, UNSPECIFIED: ICD-10-CM

## 2025-03-03 DIAGNOSIS — Z98.890 OTHER SPECIFIED POSTPROCEDURAL STATES: Chronic | ICD-10-CM

## 2025-03-03 DIAGNOSIS — M06.9 RHEUMATOID ARTHRITIS, UNSPECIFIED: ICD-10-CM

## 2025-03-03 PROCEDURE — 99214 OFFICE O/P EST MOD 30 MIN: CPT | Mod: GC

## 2025-03-03 PROCEDURE — G2211 COMPLEX E/M VISIT ADD ON: CPT

## 2025-03-03 RX ORDER — BENZONATATE 100 MG/1
100 CAPSULE ORAL 3 TIMES DAILY
Qty: 30 | Refills: 0 | Status: ACTIVE | COMMUNITY
Start: 2025-03-03 | End: 1900-01-01

## 2025-03-04 DIAGNOSIS — Z79.899 OTHER LONG TERM (CURRENT) DRUG THERAPY: ICD-10-CM

## 2025-03-04 DIAGNOSIS — M71.9 BURSOPATHY, UNSPECIFIED: ICD-10-CM

## 2025-03-04 DIAGNOSIS — R05.9 COUGH, UNSPECIFIED: ICD-10-CM

## 2025-03-04 LAB
ALBUMIN SERPL ELPH-MCNC: 4 G/DL
ALP BLD-CCNC: 100 U/L
ALT SERPL-CCNC: 11 U/L
ANION GAP SERPL CALC-SCNC: 12 MMOL/L
AST SERPL-CCNC: 12 U/L
BASOPHILS # BLD AUTO: 0.07 K/UL
BASOPHILS NFR BLD AUTO: 1.1 %
BILIRUB SERPL-MCNC: 0.3 MG/DL
BUN SERPL-MCNC: 6 MG/DL
CALCIUM SERPL-MCNC: 10.6 MG/DL
CHLORIDE SERPL-SCNC: 106 MMOL/L
CO2 SERPL-SCNC: 26 MMOL/L
CREAT SERPL-MCNC: 0.59 MG/DL
CRP SERPL-MCNC: <3 MG/L
EGFRCR SERPLBLD CKD-EPI 2021: 97 ML/MIN/1.73M2
EOSINOPHIL # BLD AUTO: 0.05 K/UL
EOSINOPHIL NFR BLD AUTO: 0.8 %
ERYTHROCYTE [SEDIMENTATION RATE] IN BLOOD BY WESTERGREN METHOD: 92 MM/HR
GLUCOSE SERPL-MCNC: 114 MG/DL
HCT VFR BLD CALC: 40 %
HEPB DNA PCR INT: NOT DETECTED
HEPB DNA PCR LOG: NOT DETECTED LOGIU/ML
HGB BLD-MCNC: 12 G/DL
IMM GRANULOCYTES NFR BLD AUTO: 0.3 %
LYMPHOCYTES # BLD AUTO: 3.25 K/UL
LYMPHOCYTES NFR BLD AUTO: 51 %
MAN DIFF?: NORMAL
MCHC RBC-ENTMCNC: 28.4 PG
MCHC RBC-ENTMCNC: 30 G/DL
MCV RBC AUTO: 94.8 FL
MONOCYTES # BLD AUTO: 0.61 K/UL
MONOCYTES NFR BLD AUTO: 9.6 %
NEUTROPHILS # BLD AUTO: 2.37 K/UL
NEUTROPHILS NFR BLD AUTO: 37.2 %
PLATELET # BLD AUTO: 483 K/UL
POTASSIUM SERPL-SCNC: 4.6 MMOL/L
PROT SERPL-MCNC: 8.2 G/DL
RBC # BLD: 4.22 M/UL
RBC # FLD: 14 %
SODIUM SERPL-SCNC: 143 MMOL/L
WBC # FLD AUTO: 6.37 K/UL

## 2025-05-05 ENCOUNTER — OUTPATIENT (OUTPATIENT)
Dept: OUTPATIENT SERVICES | Facility: HOSPITAL | Age: 70
LOS: 1 days | End: 2025-05-05

## 2025-05-05 ENCOUNTER — APPOINTMENT (OUTPATIENT)
Dept: RHEUMATOLOGY | Facility: CLINIC | Age: 70
End: 2025-05-05
Payer: MEDICARE

## 2025-05-05 VITALS
SYSTOLIC BLOOD PRESSURE: 140 MMHG | HEART RATE: 102 BPM | OXYGEN SATURATION: 96 % | BODY MASS INDEX: 31.82 KG/M2 | DIASTOLIC BLOOD PRESSURE: 60 MMHG | WEIGHT: 198 LBS | HEIGHT: 66 IN

## 2025-05-05 DIAGNOSIS — M06.9 RHEUMATOID ARTHRITIS, UNSPECIFIED: ICD-10-CM

## 2025-05-05 DIAGNOSIS — Z98.890 OTHER SPECIFIED POSTPROCEDURAL STATES: Chronic | ICD-10-CM

## 2025-05-05 DIAGNOSIS — M70.62 TROCHANTERIC BURSITIS, LEFT HIP: ICD-10-CM

## 2025-05-05 DIAGNOSIS — M54.50 LOW BACK PAIN, UNSPECIFIED: ICD-10-CM

## 2025-05-05 PROCEDURE — 99214 OFFICE O/P EST MOD 30 MIN: CPT

## 2025-05-05 PROCEDURE — G2211 COMPLEX E/M VISIT ADD ON: CPT

## 2025-05-05 RX ORDER — LIDOCAINE HCL/PF 10 MG/ML
1 VIAL (ML) INJECTION
Refills: 0 | Status: COMPLETED | OUTPATIENT
Start: 2025-05-05

## 2025-05-05 RX ORDER — METHYLPRED ACET/NACL,ISO-OS/PF 40 MG/ML
40 VIAL (ML) INJECTION
Refills: 0 | Status: COMPLETED | OUTPATIENT
Start: 2025-05-05

## 2025-05-05 RX ADMIN — LIDOCAINE HYDROCHLORIDE %: 10 INJECTION, SOLUTION INFILTRATION; PERINEURAL at 00:00

## 2025-05-05 RX ADMIN — METHYLPREDNISOLONE ACETATE MG/ML: 40 INJECTION, SUSPENSION INTRA-ARTICULAR; INTRALESIONAL; INTRAMUSCULAR; SOFT TISSUE at 00:00

## 2025-05-06 DIAGNOSIS — R76.8 OTHER SPECIFIED ABNORMAL IMMUNOLOGICAL FINDINGS IN SERUM: ICD-10-CM

## 2025-05-06 DIAGNOSIS — M54.50 LOW BACK PAIN, UNSPECIFIED: ICD-10-CM

## 2025-05-16 LAB
ALBUMIN SERPL ELPH-MCNC: 4.6 G/DL
ALP BLD-CCNC: 123 U/L
ALT SERPL-CCNC: 21 U/L
ANION GAP SERPL CALC-SCNC: 14 MMOL/L
AST SERPL-CCNC: 20 U/L
BILIRUB SERPL-MCNC: 0.4 MG/DL
BUN SERPL-MCNC: 12 MG/DL
CALCIUM SERPL-MCNC: 11.1 MG/DL
CHLORIDE SERPL-SCNC: 102 MMOL/L
CO2 SERPL-SCNC: 26 MMOL/L
CREAT SERPL-MCNC: 0.64 MG/DL
CRP SERPL-MCNC: <3 MG/L
EGFRCR SERPLBLD CKD-EPI 2021: 95 ML/MIN/1.73M2
ERYTHROCYTE [SEDIMENTATION RATE] IN BLOOD BY WESTERGREN METHOD: 71 MM/HR
GLUCOSE SERPL-MCNC: 130 MG/DL
HAV IGM SER QL: NONREACTIVE
HBV CORE IGM SER QL: NONREACTIVE
HBV SURFACE AG SER QL: NONREACTIVE
HCT VFR BLD CALC: 41.1 %
HCV AB SER QL: NONREACTIVE
HCV S/CO RATIO: 0.19 S/CO
HEPB DNA PCR INT: NOT DETECTED
HEPB DNA PCR LOG: NOT DETECTED LOGIU/ML
HGB BLD-MCNC: 12.6 G/DL
M TB IFN-G BLD-IMP: NEGATIVE
MCHC RBC-ENTMCNC: 28.1 PG
MCHC RBC-ENTMCNC: 30.7 G/DL
MCV RBC AUTO: 91.7 FL
PLATELET # BLD AUTO: 305 K/UL
POTASSIUM SERPL-SCNC: 4.2 MMOL/L
PROT SERPL-MCNC: 8.5 G/DL
QUANTIFERON TB PLUS MITOGEN MINUS NIL: >10 IU/ML
QUANTIFERON TB PLUS NIL: 0.07 IU/ML
QUANTIFERON TB PLUS TB1 MINUS NIL: 0 IU/ML
QUANTIFERON TB PLUS TB2 MINUS NIL: 0.02 IU/ML
RBC # BLD: 4.48 M/UL
RBC # FLD: 13.5 %
SODIUM SERPL-SCNC: 142 MMOL/L
WBC # FLD AUTO: 6.68 K/UL

## 2025-05-28 ENCOUNTER — APPOINTMENT (OUTPATIENT)
Dept: RADIOLOGY | Facility: IMAGING CENTER | Age: 70
End: 2025-05-28

## 2025-05-28 ENCOUNTER — OUTPATIENT (OUTPATIENT)
Dept: OUTPATIENT SERVICES | Facility: HOSPITAL | Age: 70
LOS: 1 days | End: 2025-05-28
Payer: COMMERCIAL

## 2025-05-28 ENCOUNTER — APPOINTMENT (OUTPATIENT)
Dept: RADIOLOGY | Facility: IMAGING CENTER | Age: 70
End: 2025-05-28
Payer: MEDICARE

## 2025-05-28 DIAGNOSIS — Z98.890 OTHER SPECIFIED POSTPROCEDURAL STATES: Chronic | ICD-10-CM

## 2025-05-28 PROCEDURE — 73130 X-RAY EXAM OF HAND: CPT | Mod: 26,LT,RT

## 2025-05-28 PROCEDURE — 77085 DXA BONE DENSITY AXL VRT FX: CPT

## 2025-05-28 PROCEDURE — 73630 X-RAY EXAM OF FOOT: CPT

## 2025-05-28 PROCEDURE — 72052 X-RAY EXAM NECK SPINE 6/>VWS: CPT | Mod: 26

## 2025-05-28 PROCEDURE — 73630 X-RAY EXAM OF FOOT: CPT | Mod: 26,LT,RT

## 2025-05-28 PROCEDURE — 73130 X-RAY EXAM OF HAND: CPT

## 2025-05-28 PROCEDURE — 77085 DXA BONE DENSITY AXL VRT FX: CPT | Mod: 26

## 2025-05-28 PROCEDURE — 72052 X-RAY EXAM NECK SPINE 6/>VWS: CPT

## 2025-08-18 ENCOUNTER — OUTPATIENT (OUTPATIENT)
Dept: OUTPATIENT SERVICES | Facility: HOSPITAL | Age: 70
LOS: 1 days | End: 2025-08-18

## 2025-08-18 ENCOUNTER — APPOINTMENT (OUTPATIENT)
Dept: RHEUMATOLOGY | Facility: CLINIC | Age: 70
End: 2025-08-18
Payer: MEDICARE

## 2025-08-18 VITALS
HEART RATE: 97 BPM | OXYGEN SATURATION: 94 % | WEIGHT: 194 LBS | SYSTOLIC BLOOD PRESSURE: 122 MMHG | BODY MASS INDEX: 31.18 KG/M2 | HEIGHT: 66 IN | DIASTOLIC BLOOD PRESSURE: 64 MMHG

## 2025-08-18 DIAGNOSIS — M06.9 RHEUMATOID ARTHRITIS, UNSPECIFIED: ICD-10-CM

## 2025-08-18 DIAGNOSIS — R76.8 OTHER SPECIFIED ABNORMAL IMMUNOLOGICAL FINDINGS IN SERUM: ICD-10-CM

## 2025-08-18 DIAGNOSIS — M25.552 PAIN IN LEFT HIP: ICD-10-CM

## 2025-08-18 DIAGNOSIS — Z79.899 OTHER LONG TERM (CURRENT) DRUG THERAPY: ICD-10-CM

## 2025-08-18 DIAGNOSIS — Z98.890 OTHER SPECIFIED POSTPROCEDURAL STATES: Chronic | ICD-10-CM

## 2025-08-18 PROCEDURE — G2211 COMPLEX E/M VISIT ADD ON: CPT

## 2025-08-18 PROCEDURE — 99214 OFFICE O/P EST MOD 30 MIN: CPT | Mod: GC

## 2025-08-19 DIAGNOSIS — R76.8 OTHER SPECIFIED ABNORMAL IMMUNOLOGICAL FINDINGS IN SERUM: ICD-10-CM

## 2025-08-19 DIAGNOSIS — Z79.899 OTHER LONG TERM (CURRENT) DRUG THERAPY: ICD-10-CM

## 2025-08-19 DIAGNOSIS — M25.552 PAIN IN LEFT HIP: ICD-10-CM

## 2025-08-22 ENCOUNTER — NON-APPOINTMENT (OUTPATIENT)
Age: 70
End: 2025-08-22

## 2025-08-22 ENCOUNTER — APPOINTMENT (OUTPATIENT)
Dept: OBGYN | Facility: CLINIC | Age: 70
End: 2025-08-22
Payer: MEDICARE

## 2025-08-22 VITALS
HEIGHT: 66 IN | HEART RATE: 105 BPM | BODY MASS INDEX: 31.5 KG/M2 | DIASTOLIC BLOOD PRESSURE: 71 MMHG | WEIGHT: 196 LBS | SYSTOLIC BLOOD PRESSURE: 146 MMHG

## 2025-08-22 DIAGNOSIS — N95.0 POSTMENOPAUSAL BLEEDING: ICD-10-CM

## 2025-08-22 DIAGNOSIS — N89.8 OTHER SPECIFIED NONINFLAMMATORY DISORDERS OF VAGINA: ICD-10-CM

## 2025-08-22 PROCEDURE — 99459 PELVIC EXAMINATION: CPT

## 2025-08-22 PROCEDURE — 99204 OFFICE O/P NEW MOD 45 MIN: CPT

## 2025-08-22 RX ORDER — METRONIDAZOLE 7.5 MG/G
0.75 GEL VAGINAL
Qty: 1 | Refills: 0 | Status: ACTIVE | COMMUNITY
Start: 2025-08-22 | End: 1900-01-01

## 2025-08-25 PROBLEM — A59.09 TRICHOMONAL CERVICITIS: Status: ACTIVE | Noted: 2025-08-25

## 2025-08-25 LAB
BV BACTERIA RRNA VAG QL NAA+PROBE: NOT DETECTED
C GLABRATA RNA VAG QL NAA+PROBE: NOT DETECTED
C TRACH RRNA SPEC QL NAA+PROBE: NOT DETECTED
CANDIDA RRNA VAG QL PROBE: NOT DETECTED
HPV HIGH+LOW RISK DNA PNL CVX: NOT DETECTED
N GONORRHOEA RRNA SPEC QL NAA+PROBE: NOT DETECTED
T VAGINALIS RRNA SPEC QL NAA+PROBE: DETECTED

## 2025-08-25 RX ORDER — METRONIDAZOLE 500 MG/1
500 TABLET ORAL TWICE DAILY
Qty: 28 | Refills: 0 | Status: ACTIVE | COMMUNITY
Start: 2025-08-25 | End: 1900-01-01

## 2025-09-03 ENCOUNTER — APPOINTMENT (OUTPATIENT)
Dept: OBGYN | Facility: CLINIC | Age: 70
End: 2025-09-03

## 2025-09-04 ENCOUNTER — ASOB RESULT (OUTPATIENT)
Age: 70
End: 2025-09-04

## 2025-09-04 ENCOUNTER — APPOINTMENT (OUTPATIENT)
Dept: OBGYN | Facility: CLINIC | Age: 70
End: 2025-09-04
Payer: MEDICARE

## 2025-09-04 VITALS
SYSTOLIC BLOOD PRESSURE: 155 MMHG | WEIGHT: 194 LBS | DIASTOLIC BLOOD PRESSURE: 72 MMHG | BODY MASS INDEX: 31.18 KG/M2 | HEIGHT: 66 IN | HEART RATE: 88 BPM

## 2025-09-04 DIAGNOSIS — A59.09 OTHER UROGENITAL TRICHOMONIASIS: ICD-10-CM

## 2025-09-04 DIAGNOSIS — N95.0 POSTMENOPAUSAL BLEEDING: ICD-10-CM

## 2025-09-04 PROCEDURE — 99213 OFFICE O/P EST LOW 20 MIN: CPT | Mod: 25

## 2025-09-04 PROCEDURE — 58100 BIOPSY OF UTERUS LINING: CPT

## 2025-09-04 PROCEDURE — 76857 US EXAM PELVIC LIMITED: CPT | Mod: 59

## 2025-09-04 PROCEDURE — 76830 TRANSVAGINAL US NON-OB: CPT

## 2025-09-04 PROCEDURE — 99459 PELVIC EXAMINATION: CPT

## 2025-09-06 LAB
BV BACTERIA RRNA VAG QL NAA+PROBE: NOT DETECTED
C GLABRATA RNA VAG QL NAA+PROBE: NOT DETECTED
C TRACH RRNA SPEC QL NAA+PROBE: NOT DETECTED
CANDIDA RRNA VAG QL PROBE: DETECTED
N GONORRHOEA RRNA SPEC QL NAA+PROBE: NOT DETECTED
T VAGINALIS RRNA SPEC QL NAA+PROBE: NOT DETECTED

## 2025-09-08 LAB — CORE LAB BIOPSY: NORMAL

## 2025-09-08 RX ORDER — FLUCONAZOLE 150 MG/1
150 TABLET ORAL
Qty: 3 | Refills: 0 | Status: ACTIVE | COMMUNITY
Start: 2025-09-08 | End: 1900-01-01

## 2025-09-24 PROBLEM — N95.2 VAGINAL ATROPHY: Status: ACTIVE | Noted: 2025-09-24
